# Patient Record
Sex: MALE | Employment: UNEMPLOYED | URBAN - METROPOLITAN AREA
[De-identification: names, ages, dates, MRNs, and addresses within clinical notes are randomized per-mention and may not be internally consistent; named-entity substitution may affect disease eponyms.]

---

## 2020-02-29 ENCOUNTER — HOSPITAL ENCOUNTER (EMERGENCY)
Age: 20
Discharge: HOME | End: 2020-03-01
Attending: EMERGENCY MEDICINE
Payer: MEDICAID

## 2020-02-29 ENCOUNTER — APPOINTMENT (OUTPATIENT)
Dept: GENERAL RADIOLOGY | Age: 20
End: 2020-02-29
Attending: EMERGENCY MEDICINE
Payer: MEDICAID

## 2020-02-29 DIAGNOSIS — S82.822A CLOSED TORUS FRACTURE OF DISTAL END OF LEFT FIBULA, INITIAL ENCOUNTER: Primary | ICD-10-CM

## 2020-02-29 PROCEDURE — 99284 EMERGENCY DEPT VISIT MOD MDM: CPT

## 2020-02-29 PROCEDURE — 75810000053 HC SPLINT APPLICATION

## 2020-02-29 PROCEDURE — 73610 X-RAY EXAM OF ANKLE: CPT

## 2020-02-29 RX ORDER — HYDROCODONE BITARTRATE AND ACETAMINOPHEN 5; 325 MG/1; MG/1
1 TABLET ORAL
Status: COMPLETED | OUTPATIENT
Start: 2020-02-29 | End: 2020-03-01

## 2020-02-29 RX ORDER — HYDROCODONE BITARTRATE AND ACETAMINOPHEN 5; 325 MG/1; MG/1
1 TABLET ORAL
Qty: 18 TAB | Refills: 0 | Status: SHIPPED | OUTPATIENT
Start: 2020-02-29 | End: 2020-03-03

## 2020-02-29 NOTE — LETTER
[facility] [place] 
THE [facility] EMERGENCY DEPT 
[address] [address] [phone] Work/School Note Date: 2/29/2020 To Whom It May concern: 
 
Esmer Walker was seen and treated today in the emergency room by the following provider(s): 
Attending Provider: Valentina Griffin DO Physician Assistant: JOSE Dawn. Esmer Walker may return to school on 3/3/2020. Sincerely, Odalis Abbott RN

## 2020-03-01 VITALS
HEART RATE: 84 BPM | TEMPERATURE: 98.7 F | SYSTOLIC BLOOD PRESSURE: 150 MMHG | OXYGEN SATURATION: 100 % | DIASTOLIC BLOOD PRESSURE: 79 MMHG | RESPIRATION RATE: 18 BRPM | WEIGHT: 253 LBS | HEIGHT: 76 IN | BODY MASS INDEX: 30.81 KG/M2

## 2020-03-01 PROCEDURE — 74011250637 HC RX REV CODE- 250/637: Performed by: PHYSICIAN ASSISTANT

## 2020-03-01 RX ADMIN — HYDROCODONE BITARTRATE AND ACETAMINOPHEN 1 TABLET: 5; 325 TABLET ORAL at 00:28

## 2020-03-01 NOTE — ED PROVIDER NOTES
EMERGENCY DEPARTMENT HISTORY AND PHYSICAL EXAM    Date: 2/29/2020  Patient Name: Yesica Crowlel    History of Presenting Illness     Chief Complaint   Patient presents with    Ankle Injury         History Provided By: Patient    Yesica Crowell is a 23 y.o. male who presents to the emergency department C/O left ankle pain & swelling. She reports that just prior to arrival was [place] [employer] [place] when patient came down on his left ankle everting his foot and ankle. Patient states he had immediate pain and swelling. He is unable to bear weight due to pain. Pt denies head injury, knee pain, foot pain, pain anywhere else wounds, and any other sxs or complaints. PCP: None        Past History     Past Medical History:  History reviewed. No pertinent past medical history. Past Surgical History:  History reviewed. No pertinent surgical history. Family History:  History reviewed. No pertinent family history. Social History:  Social History     Tobacco Use    Smoking status: Never Smoker    Smokeless tobacco: Never Used   Substance Use Topics    Alcohol use: Not on file    Drug use: Never       Allergies:  No Known Allergies      Review of Systems   Review of Systems   Musculoskeletal: Positive for arthralgias. Negative for back pain and neck pain. Skin: Negative for wound. All other systems reviewed and are negative. Physical Exam     Vitals:    02/29/20 2300   BP: 142/72   Pulse: 84   Resp: 18   Temp: 98.7 °F (37.1 °C)   SpO2: 100%   Weight: 114.8 kg (253 lb)   Height: 6' 4\" (1.93 m)     Physical Exam  Vitals signs and nursing note reviewed. Constitutional:       General: He is not in acute distress. Appearance: He is not ill-appearing. HENT:      Head: Normocephalic and atraumatic. Nose: Nose normal.      Mouth/Throat:      Mouth: Mucous membranes are moist.   Eyes:      Extraocular Movements: Extraocular movements intact.       Conjunctiva/sclera: Conjunctivae normal. Pupils: Pupils are equal, round, and reactive to light. Neck:      Musculoskeletal: Normal range of motion and neck supple. Musculoskeletal:         General: Swelling and tenderness present. Left knee: Normal.      Left ankle: He exhibits decreased range of motion and swelling. He exhibits no ecchymosis and normal pulse. Tenderness. Achilles tendon normal.      Left foot: Normal.      Comments: LLE: NVI   Skin:     General: Skin is warm and dry. Capillary Refill: Capillary refill takes less than 2 seconds. Neurological:      General: No focal deficit present. Mental Status: He is alert and oriented to person, place, and time. Psychiatric:         Mood and Affect: Mood normal.         Behavior: Behavior normal.         Diagnostic Study Results     Labs -   No results found for this or any previous visit (from the past 12 hour(s)). Radiologic Studies -   XR ANKLE LT MIN 3 V    (Results Pending)     CT Results  (Last 48 hours)    None        CXR Results  (Last 48 hours)    None          Medications given in the ED-  Medications   HYDROcodone-acetaminophen (NORCO) 5-325 mg per tablet 1 Tab (has no administration in time range)         Medical Decision Making   I am the first provider for this patient. I reviewed the vital signs, available nursing notes, past medical history, past surgical history, family history and social history. Vital Signs-Reviewed the patient's vital signs. Pulse Oximetry Analysis - 100% on RA     Records Reviewed: Nursing Notes    Procedures:  Procedures    ED Course:   11:01 PM   Initial assessment performed. The patients presenting problems have been discussed, and they are in agreement with the care plan formulated and outlined with them. I have encouraged them to ask questions as they arise throughout their visit. Procedure Note - Splint Assessement:  Performed by: JOSE Duke   Splint to left lower leg assessed. Neurovascularly intact.   The procedure took 1-15 minutes, and pt tolerated well. Written by JOSE Coats    Discussion: 23 y.o. male traumatic left ankle pain while jumping at a local trampoline park. He is neurovascular intact with appropriate vital signs x-rays reveal distal fibular fracture. Splint, crutches, rice instructions, pain control, and orthopedic follow-up. Strict return precautions discussed. Diagnosis and Disposition       DISCHARGE NOTE:  Jose Hoffmann's  results have been reviewed with him. He has been counseled regarding his diagnosis, treatment, and plan. He verbally conveys understanding and agreement of the signs, symptoms, diagnosis, treatment and prognosis and additionally agrees to follow up as discussed. He also agrees with the care-plan and conveys that all of his questions have been answered. I have also provided discharge instructions for him that include: educational information regarding their diagnosis and treatment, and list of reasons why they would want to return to the ED prior to their follow-up appointment, should his condition change. He has been provided with education for proper emergency department utilization. CLINICAL IMPRESSION:    1. Closed torus fracture of distal end of left fibula, initial encounter        PLAN:  1. D/C Home  2. Current Discharge Medication List      START taking these medications    Details   HYDROcodone-acetaminophen (NORCO) 5-325 mg per tablet Take 1 Tab by mouth every four (4) hours as needed for Pain for up to 3 days. Max Daily Amount: 6 Tabs. Qty: 18 Tab, Refills: 0    Associated Diagnoses: Closed torus fracture of distal end of left fibula, initial encounter           3.    Follow-up Information     Follow up With Specialties Details Why Contact Info    Delilah Guevara MD Orthopedic Surgery Schedule an appointment as soon as possible for a visit  [facility] [address]eves  [address] [facility] EMERGENCY DEPT Emergency Medicine  As needed, If symptoms worsen [address] [place] [address]  [phone]                  Please note that this dictation was completed with [employer], the computer voice recognition software. Quite often unanticipated grammatical, syntax, homophones, and other interpretive errors are inadvertently transcribed by the computer software. Please disregard these errors. Please excuse any errors that have escaped final proofreading.

## 2020-03-01 NOTE — ED NOTES
Splint in place with Cap Refill WNL. Pt reports the extremity feels better supported. Able to positively show ability to ambulate with crutches. School note given. To f/u with Ortho. Awaiting his transportation. Younger sister at his side. I have reviewed discharge instructions with the patient. The patient verbalized understanding. Discharge medications reviewed with patient and appropriate educational materials and side effects teaching were provided.   Follow-up Information     Follow up With Specialties Details Why Contact Info    Raza Demarco MD Orthopedic Surgery Schedule an appointment as soon as possible for a visit  [address] [place] [address]      THE [facility] EMERGENCY DEPT Emergency Medicine  As needed, If symptoms worsen [address] [address] [phone]

## 2020-03-11 ENCOUNTER — OFFICE VISIT (OUTPATIENT)
Dept: ORTHOPEDIC SURGERY | Age: 20
End: 2020-03-11

## 2020-03-11 VITALS
BODY MASS INDEX: 30.44 KG/M2 | WEIGHT: 250 LBS | DIASTOLIC BLOOD PRESSURE: 111 MMHG | TEMPERATURE: 98.3 F | HEART RATE: 80 BPM | OXYGEN SATURATION: 98 % | SYSTOLIC BLOOD PRESSURE: 160 MMHG | RESPIRATION RATE: 16 BRPM | HEIGHT: 76 IN

## 2020-03-11 DIAGNOSIS — S82.62XA CLOSED DISPLACED FRACTURE OF LATERAL MALLEOLUS OF LEFT FIBULA, INITIAL ENCOUNTER: Primary | ICD-10-CM

## 2020-03-11 DIAGNOSIS — M25.572 ACUTE LEFT ANKLE PAIN: ICD-10-CM

## 2020-03-11 DIAGNOSIS — S93.492A SYNDESMOTIC ANKLE SPRAIN, LEFT, INITIAL ENCOUNTER: ICD-10-CM

## 2020-03-11 DIAGNOSIS — M79.672 LEFT FOOT PAIN: ICD-10-CM

## 2020-03-11 RX ORDER — OXYCODONE AND ACETAMINOPHEN 5; 325 MG/1; MG/1
1 TABLET ORAL
Qty: 30 TAB | Refills: 0 | Status: SHIPPED | OUTPATIENT
Start: 2020-03-11 | End: 2020-03-11 | Stop reason: SDUPTHER

## 2020-03-11 RX ORDER — CEPHALEXIN 500 MG/1
500 CAPSULE ORAL 4 TIMES DAILY
Qty: 28 CAP | Refills: 0 | Status: SHIPPED | OUTPATIENT
Start: 2020-03-11 | End: 2020-03-18

## 2020-03-11 RX ORDER — OXYCODONE AND ACETAMINOPHEN 5; 325 MG/1; MG/1
1 TABLET ORAL
Qty: 30 TAB | Refills: 0 | Status: SHIPPED | OUTPATIENT
Start: 2020-03-11 | End: 2020-03-16

## 2020-03-11 NOTE — PROGRESS NOTES
1. Have you been to the ER, urgent care clinic since your last visit? Hospitalized since your last visit? No    2. Have you seen or consulted any other health care providers outside of the [address] [place] [address] since your last visit? Include any pap smears or colon screening.  No

## 2020-03-11 NOTE — PROGRESS NOTES
OFFICE NOTE     Patient: Laura Joel                MRN: [id number]       SSN: [id number]  YOB: 2000                 AGE: 23 y.o. SEX: male    PCP:No primary care provider on file. Chief Complaint:   Chief Complaint   Patient presents with    Ankle Pain     Left ankle pain        DOI: 2/26/20    HPI:     Laura Joel is a 23 y.o. male who presents for consultation and evaluation of left ankle injury sustained 2 weeks ago while jumping on a trampoline. Patient states that when was jumping, he heard the bones in his ankle pop and then pop back. He states that he had severe pain and swelling and was unable to WB. He was seen at [facility] on 2/29/20. X-rays were performed and patient was placed in a posterior splint. He has been WB in the splint. He was given and rx for Aleve and Norco which has not provided any relief. He states that he has pain in his midfoot first thing in the morning. He has been applying some weight on the left foot. Patient has continued to have swelling and pain to the left foot with characteristics confirmed as described in the pain assessment below. Patient rates his pain as 6/10 today. Symptoms are aggravated by attempted WB, standing, walking and certain movement/activity which causes an increase in pain. Pain is alleviated by rest. Patient reports difficulty with daily activity. Patient has left ankle fracture with syndesmotic disruption which requires surgical fixation once his soft tissues are more amenable to surgery. We will see the patient back in the office on Tuesday next week for evaluation of soft tissues. Plan is for surgery next Thursday or Friday. Patient will have labs completed in the meantime. Laura Joel has been experiencing pain, discomfort and associated symptoms confirmed as outlined in the pain assessment.  In addition, the patient has tried modalities of treatment and/or self treatment confirmed as outlined in the pain assessment below. Pain Assessment  3/11/2020   Location of Pain Ankle   Location Modifiers Left   Severity of Pain 6   Quality of Pain Throbbing   Quality of Pain Comment Swelling    Duration of Pain A few hours   Frequency of Pain Intermittent   Aggravating Factors Standing;Walking   Aggravating Factors Comment Not elevated, pressure going down the foot    Relieving Factors Rest;Elevation   Relieving Factors Comment Epsomn salt baths   Result of Injury No   Type of Injury (No Data)   Type of Injury Comment Trampolalejandro Cortez  has a past medical history of Body piercing, Marijuana use, and Orthodontics. 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 Closed displaced fracture of lateral malleolus of left fibula, initial encounter S82. 62XA 824.2   2. Syndesmotic ankle sprain, left, initial encounter S93.432A 845.03   3. Acute left ankle pain M25.572 719.47   4. Left foot pain M79.672 729.5         This plan was reviewed with the patient and patient agrees. All questions were answered. More than half of this visit today was spent on counseling. PLAN:         Orders Placed This Encounter    CULTURE, URINE     Standing Status:   Future     Standing Expiration Date:   3/12/2021    AMB POC XRAY, ANKLE; COMPLETE, 3+ VIE     ASK ALL FEMALE PATIENTS IF THEY ARE PREGNANT     Order Specific Question:   Reason for Exam     Answer:   PAIN    [72115] Foot Min 3V     Order Specific Question:   Weight bearing? Answer:   No    XR CHEST PA LAT     Standing Status:   Future     Standing Expiration Date:   9/13/2020     Scheduling Instructions:      Please recheck to confirm if:      1. Patient has Allergry to IV contrast dye      2.  Patient is pregnant     Order Specific Question:   Reason for Exam     Answer:   Preop Risk stratificatiion    CBC WITH AUTOMATED DIFF     Standing Status:   Future     Standing Expiration Date: 0/27/0307    METABOLIC PANEL, COMPREHENSIVE     Standing Status:   Future     Standing Expiration Date:   3/12/2021    SED RATE (ESR)     Standing Status:   Future     Standing Expiration Date:   3/12/2021    PTT     Standing Status:   Future     Standing Expiration Date:   3/12/2021    URINALYSIS W/ RFLX MICROSCOPIC     Standing Status:   Future     Standing Expiration Date:   3/12/2021    DRUG SCREEN, URINE ([facility] Only)     Standing Status:   Future     Standing Expiration Date:   3/12/2021    C REACTIVE PROTEIN, QT     Standing Status:   Future     Standing Expiration Date:   3/12/2021    URIC ACID     Standing Status:   Future     Standing Expiration Date:   3/12/2021    EKG, 12 LEAD, SUBSEQUENT     Standing Status:   Future     Standing Expiration Date:   9/9/2020     Order Specific Question:   Reason for Exam:     Answer:   pre op    cephALEXin (KEFLEX) 500 mg capsule     Sig: Take 1 Cap by mouth four (4) times daily for 7 days. Dispense:  28 Cap     Refill:  0    DISCONTD: oxyCODONE-acetaminophen (Percocet) 5-325 mg per tablet     Sig: Take 1 Tab by mouth every four (4) hours as needed for Pain for up to 5 days. Max Daily Amount: 6 Tabs. Dispense:  30 Tab     Refill:  0    oxyCODONE-acetaminophen (Percocet) 5-325 mg per tablet     Sig: Take 1 Tab by mouth every four (4) hours as needed for Pain for up to 5 days. Max Daily Amount: 6 Tabs. Dispense:  30 Tab     Refill:  0          1. Risk factors include: increased BMI, FH of DM  2. Cortisone injection and/or aspiration indicated today: NO  3. Physical/Occupational Therapy indicated today: NO  4. Diagnostic test is indicated today: YES  5. Durable medical equipment is indicated today: YES  6. Referral indicated today: NO  7.  Medications indicated today: YES  8. Surgical intervention indicated at this time: YES, patient has left ankle fracture with syndesmotic disruption which requires surgical fixation once his soft tissues are more amenable to surgery. We will see the patient back in the office on Tuesday next week for evaluation of soft tissues. Plan is for surgery next Thursday or Friday. Patient will have labs completed in the meantime. Dr. Yolie Mcgowan has been consulted regarding the patient during this visit and he agrees with the assessment and plan    Patient expresses understanding of the diagnosis and treatment plan. Patient education has been provided. PHYSICAL EXAM:     Patient arrives to office via: with assistive device: posterior splint  Patient accompanied in the examination room  by: grandmother       Visit Vitals  BP (!) 160/111   Pulse 80   Temp 98.3 °F (36.8 °C) (Oral)   Resp 16   Ht 6' 4\" (1.93 m)   Wt 250 lb (113.4 kg)   SpO2 98%   BMI 30.43 kg/m²       Pain Scale: 6/10    Blood pressure is: elevated on examination today. APPEARANCE: In general, Svitlana Power is an alert, well appearing, 23 y.o. male who is oriented to person, place/time, and who follows commands. Patient is well-developed, well-nourished with a normal affect. Seated comfortably in no acute distress. Speech normal in context and clarity. The patient is afebrile. Current BMI is: Body mass index is 30.43 kg/m². As such, the treatment plan is more complex. PSYCHIATRIC: Affect, mood, judgement, behavior and conduct are appropriate. Memory grossly intact, no dementia  HEENT: Head normocephalic & atraumatic              Eye: EOM are intact. Both pupils are round and reaction to light and accommodation. Sclera are clear              Neck: Supple. ROM WNL. JVD is not present              Hearings Intact, does not require hearing aid device  RESPIRATORY: Breathing is unlabored without accessory chest muscle use  CARDIOVASCULAR/PERIPHERAL VASCULAR: Normal Pulses to each foot    MUSCULOSKELETAL: EXAMINATION OF:     ANKLE/FOOT left     Gait: antalgic   Tenderness:  Moderate tenderness to the anterolateral ankle  posterolateral ankle  anteromedial ankle  cuboid  distal fibula  midfoot. Calf non-tender to palpation  Cutaneous: Edema and erythema noted to mid/forefoot. Otherwise, the skin is intact. No rash or skin lesions. No warmth, erythema or ecchymosis. No signs of infection or cellulitis present. Joint Motion: Not tested  Joint / Tendon Stability: Not tested  Alignment: Forefoot, Midfoot, Hindfoot WNL. Deformity: not present   Neuro Motor/Sensory: NL/NL. Vascular: NL foot/ankle pulses  Lymphatics: No extremity lymphedema, No calf swelling, no tenderness to calf muscles. RADIOGRAPHS & DIAGNOSTIC STUDIES      3/11/2020 AT [address] [place] [address]    X-rays, 5 views of the left foot and ankle reveals a displaced fracture to the distal fibula, medial space widening is note. Soft tissue swelling is moderate. No osteolytic or osteoblastic lesions noted. Mineralization suggests no osteopenia. Degenerative changes are not noted. Calcified vessels are not present. Foot films are unremarkable. I have personally reviewed the results of the above study. The interpretation of this study is my professional opinion      REVIEW OF SYSTEMS:     REVIEW OF SYSTEMS: All Below are Negative except as indicated in the HPI: See HPI                Constitutional: negative for fever, chills, night sweats and unexpected weight loss and malaise/fatigue              HEENT: Negative. No hoarseness, no double vision              Respiratory: Negative. No difficulty breathing, No SOB              Cardiovascular: negative for chest pain, claudication, RUTH. Leg swelling               Gastrointestinal: Negative for pain, Blood in stool, incontinence, pelvic pain, N/V/C/D              Genitourinary: No saddle anesthesia, pelvic pain, blood in urine, incontinence, dysuria               Neurological: Negative for dizziness and weakness, visual changes, confusion, headaches, seizures              Phychiatric/Behavioral: Negative for depression, memory loss.  Substance abuse Extremities: Negative for hair changes, rash, or skin lesion changes              Hematologic: Negative for bleeding problems, bruising, pallor or swollen lymph nodes              Peripheral Vascular: No calf pain, no circulation deficits              Musculoskeletal: As per HPI above    PAST MEDICAL HISTORY:       Past Medical History:   Diagnosis Date    Body piercing     Nose    Marijuana use     Last used on 3/9/20    Orthodontics           There are no active hospital problems to display for this patient. PAST SURGICAL HISTORY     History reviewed. No pertinent surgical history. ALLERGIES:     Allergies   Allergen Reactions    Seasonale [Levonorgestrel-Ethinyl Estrad] Sneezing    Shellfish Derived Swelling     Throat swells up          MEDICATIONS:     Current Outpatient Medications   Medication Sig    cephALEXin (KEFLEX) 500 mg capsule Take 1 Cap by mouth four (4) times daily for 7 days.  oxyCODONE-acetaminophen (Percocet) 5-325 mg per tablet Take 1 Tab by mouth every four (4) hours as needed for Pain for up to 5 days. Max Daily Amount: 6 Tabs. No current facility-administered medications for this visit.           SOCIAL HISTORY:     Social History     Occupational History    Not on file   Tobacco Use    Smoking status: Never Smoker    Smokeless tobacco: Never Used   Substance and Sexual Activity    Alcohol use: Not on file    Drug use: Yes     Types: Marijuana     Comment: last used on 3/9/20    Sexual activity: Not on file          FAMILY HISTORY:     Family History   Problem Relation Age of Onset    No Known Problems Mother     No Known Problems Father     Hypertension Maternal Grandmother     Diabetes Maternal Grandmother           Social History     Social History Narrative    Not on file        Social History     Socioeconomic History    Marital status: UNKNOWN     Spouse name: Not on file    Number of children: Not on file    Years of education: Not on file   Ceci Link Highest education level: Not on file   Occupational History    Not on file   Social Needs    Financial resource strain: Not on file    Food insecurity     Worry: Not on file     Inability: Not on file    Transportation needs     Medical: Not on file     Non-medical: Not on file   Tobacco Use    Smoking status: Never Smoker    Smokeless tobacco: Never Used   Substance and Sexual Activity    Alcohol use: Not on file    Drug use: Yes     Types: Marijuana     Comment: last used on 3/9/20    Sexual activity: Not on file   Lifestyle    Physical activity     Days per week: Not on file     Minutes per session: Not on file    Stress: Not on file   Relationships    Social connections     Talks on phone: Not on file     Gets together: Not on file     Attends Baptism service: Not on file     Active member of club or organization: Not on file     Attends meetings of clubs or organizations: Not on file     Relationship status: Not on file    Intimate partner violence     Fear of current or ex partner: Not on file     Emotionally abused: Not on file     Physically abused: Not on file     Forced sexual activity: Not on file   Other Topics Concern    Not on file   Social History Narrative    Not on file        Jesu Knowles [place]  3/11/2020

## 2020-03-11 NOTE — PATIENT INSTRUCTIONS
Dr. Marvel Gonzales Pre-operative Instructions: 
 
 
Patient: Seymour Boxer :  2000 I understand I am to stop taking oral birth control pills, hormonal replacement therapy and all Aspirin, Aspirin containing medications, Non-Steroidal Anti-Inflammatory medications (such as Advil, Aleve, Motrin, Ibuprofen) and or Blood thinner medication such as Coumadin, Plavix, Heparin or others 5 days prior to surgery. I understand I am to STOP taking these Medications 5 days prior to surgery: 
I am to get instructions from my prescribing physician. 1. __As listed above_______________________ 2. _____________________________________ 3. _____________________________________ 4. _____________________________________ I understand that if I am taking daily medications for high blood pressure, I can take them the morning of surgery with a small sip of water. I will consult my prescribing physician or call LANIE with specific questions. I also understand that: ? I am to report important observations or changes that may occur prior to surgery. If I have any changes in my physical condition, such as a rash, a fever, sore throat, abscess, ulcers, nausea, vomiting, or diarrhea. I am to call the office and I am to consult my primary care physician to assess and treat the problem. ? I am not to eat or drink anything after midnight the night before my surgery. ? I am not to drink alcoholic beverages 24 hours prior to surgery. ? I am not to do any illegal drugs prior to surgery. ? I am not to smoke at least 24 hours prior to surgery. ? I am able and will shower or bathe before surgery. I will use the Hibiclens solution on my surgical site only. The hibiclens directions are one packet a day starting two days before surgery. ? I will remove any nail polish, make-up or jewelry prior to arriving for my surgery. ? If I wear glasses, contact lenses or dentures they must be removed prior to going to the operating room. ? All body piercing and artifical eye-lashes must be removed prior to surgery ? I will not wear any aerosol sprays, perfumes or skin creams. ? I am to make arrangements for a family member or friend to accompany me to surgery and take me home after my surgery as I will not be allowed to leave the hospital alone. A cab or bus will not be acceptable. Please make arrange for someone to stay with you for 24 hours after surgery. ? Patient has expressed understanding of the diagnosis, treatment and planned surgery Surgery: What to Expect at [facility] Your Recovery This care sheet gives you a general idea about how long it will take for you to recover from your surgery. But each person recovers at a different pace. How can you care for yourself at home? Activity · Allow your body to heal. Don't move quickly or lift anything heavy until you are feeling better. · Rest when you feel tired. · Your doctor may give you specific instructions on when you can do your normal activities again, such as driving and going back to work. · Be active. Walking is a good choice. Diet · You can eat your normal diet when you feel well. If your stomach is upset, try bland, low-fat foods like plain rice, broiled chicken, toast, and yogurt. · If your bowel movements are not regular right after surgery, try to avoid constipation and straining. Drink plenty of water. Your doctor may suggest fiber, a stool softener, or a mild laxative. Medicines · Your doctor will tell you if and when you can restart your medicines. He or she will also give you instructions about taking any new medicines. · If you take blood thinners, such as warfarin (Coumadin), clopidogrel (Plavix), or aspirin, be sure to talk to your doctor. He or she will tell you if and when to start taking those medicines again.  Make sure that you understand exactly what your doctor wants you to do. · Be safe with medicines. Read and follow all instructions on the label. ¨ If the doctor gave you a prescription medicine for pain, take it as prescribed. ¨ If you are not taking a prescription pain medicine, ask your doctor if you can take an over-the-counter medicine. Incision care · You will have a dressing over the cut (incision). A dressing helps the incision heal and protects it. Your doctor will tell you how to take care of this. · If you have strips of tape on the cut the doctor made, leave the tape on for a week or until it falls off. · If you had stitches, your doctor will tell you when to come back to have them removed. · If you have skin adhesive on the cut, leave it on until it falls off. Skin adhesive is also called liquid stitches. · Change the bandage every day. · Wash the area daily with warm, soapy water, and pat it dry. Don't use hydrogen peroxide or alcohol. They can slow healing. · You may cover the area with a gauze bandage if it oozes fluid or rubs against clothing. · You may shower 24 to 48 hours after surgery. Pat the incision dry. Don't swim or take a bath for the first 2 weeks, or until your doctor tells you it is okay. Follow-up care is a key part of your treatment and safety. Be sure to make and go to all appointments, and call your doctor if you are having problems. It's also a good idea to know your test results and keep a list of the medicines you take. When should you call for help? Call 911 anytime you think you may need emergency care. For example, call if: 
· You passed out (lost consciousness). · You have severe trouble breathing. · You have sudden chest pain and shortness of breath, or you cough up blood. Call your doctor now or seek immediate medical care if: 
· You have pain that does not get better after you take pain medicine. · You have loose stitches, or your incision comes open. · You are bleeding through your dressing. A small amount of blood is normal. 
· You have signs of infection, such as: 
¨ Increased pain, swelling, warmth, or redness. ¨ Red streaks leading from the incision. ¨ Pus draining from the incision. ¨ A fever. · You have symptoms of a blood clot in your arm or leg (called a deep vein thrombosis). These may include: 
¨ Pain in your calf, back of the knee, thigh, or groin. ¨ Redness and swelling in the arm, leg, or groin. Watch closely for any changes in your health, and be sure to contact your doctor if: 
· You do not have a bowel movement after taking a laxative. Where can you learn more? Go to http://verito-ann marie.info/ Enter D090 in the search box to learn more about \"Surgery: What to Expect at Home. \" 
© 1753-6245 [employer]. Care instructions adapted under license by [employer] (which disclaims liability or warranty for this information). This care instruction is for use with your licensed healthcare professional. If you have questions about a medical condition or this instruction, always ask your healthcare professional. [place] [address] any warranty or liability for your use of this information. Content Version: 18.0.104884; Current as of: November 20, 2015 Acute Pain After Surgery: Care Instructions Your Care Instructions It's common to have some pain after surgery. Pain doesn't mean that something is wrong or that the surgery didn't go well. But when the pain is severe, it's important to work with your doctor to manage it. It's also important to be aware of a few facts about pain and pain medicine. · You are the only person who knows what your pain feels like. So be sure to tell your doctor when you are in pain or when the pain changes. Then he or she will know how to adjust your medicines. · Pain is often easier to control right after it starts.  So it may be better to take regular doses of pain medicine and not wait until the pain gets bad. · Medicine can help control pain. But this doesn't mean you'll have no pain. Medicine works to keep the pain at a level you can live with. With time, you will feel better. Follow-up care is a key part of your treatment and safety. Be sure to make and go to all appointments, and call your doctor if you are having problems. It's also a good idea to know your test results and keep a list of the medicines you take. How can you care for yourself at home? · Be safe with medicines. Read and follow all instructions on the label. ¨ If the doctor gave you a prescription medicine for pain, take it as prescribed. ¨ If you are not taking a prescription pain medicine, ask your doctor if you can take an over-the-counter medicine. · If you take an over-the-counter pain medicine, such as acetaminophen (Tylenol), ibuprofen (Advil, Motrin), or naproxen (Aleve), read and follow all instructions on the label. · Do not take two or more pain medicines at the same time unless the doctor told you to. · Do not drink alcohol while you are taking pain medicines. · Try to walk each day if your doctor recommends it. Start by walking a little more than you did the day before. Bit by bit, increase the amount you walk. Walking increases blood flow. It also helps prevent pneumonia and constipation. · To prevent constipation from opioid pain medicines: ¨ Talk to your doctor about a laxative. ¨ Include fruits, vegetables, beans, and whole grains in your diet each day. These foods are high in fiber. ¨ Drink plenty of fluids, enough so that your urine is light yellow or clear like water. Drink water, fruit juice, or other drinks that do not contain caffeine or alcohol. If you have kidney, heart, or liver disease and have to limit fluids, talk with your doctor before you increase the amount of fluids you drink. ¨ Take a fiber supplement, such as Citrucel or Metamucil, every day if needed. Read and follow all instructions on the label. If you take pain medicine for more than a few days, talk to your doctor before you take fiber. When should you call for help? Call your doctor now or seek immediate medical care if: 
 · Your pain gets worse. · Your pain is not controlled by medicine. Watch closely for changes in your health, and be sure to contact your doctor if you have any problems. Where can you learn more? Go to http://verito-ann marie.info/. Enter [phone] [phone] in the search box to learn more about \"Acute Pain After Surgery: Care Instructions. \" Current as of: March 20, 2017 Content Version: 11.4 © 3933-8804 [employer]. Care instructions adapted under license by [employer] (which disclaims liability or warranty for this information). If you have questions about a medical condition or this instruction, always ask your healthcare professional. [address] any warranty or liability for your use of this information.

## 2020-03-11 NOTE — LETTER
NOTIFICATION RETURN TO WORK / SCHOOL 
 
3/11/2020 12:22 PM 
 
Mr. Ty Mccray [address] [facility] [address] [place] [address] To Whom It May Concern: 
 
Ty Mccray is currently under the care of [address] [place], [place] [address]jamie Marcus. He will be out of school due to his ankle fracture until further notice. If there are questions or concerns please have the patient contact our office. Sincerely, Jong Cha PA-C

## 2020-03-11 NOTE — H&P
FOOT AND ANKLE HISTORY AND PHYSICAL      Patient: Ori Tierney                   MRN: [id number]         SSN: [id number]  YOB: 2000                   AGE: 23 y.o. SEX: male    Patient scheduled for:  Open reduction internal fixation left ankle fracture, syndesmotic stabilization, bone grafting, bone stimulator    Date of surgery: 3/13/20   Location of Surgery:  [facility]   Surgeon: Chelsea Vick. MD Adele  ANESTHESIA TYPE:  General, Popliteal block          PRESCRIPTIONS AND/OR ORDERS PROVIDED DURING H&P:    Orders Placed This Encounter    CULTURE, URINE     Standing Status:   Future     Standing Expiration Date:   3/12/2021    AMB POC XRAY, ANKLE; COMPLETE, 3+ VIE     ASK ALL FEMALE PATIENTS IF THEY ARE PREGNANT     Order Specific Question:   Reason for Exam     Answer:   PAIN    [15521] Foot Min 3V     Order Specific Question:   Weight bearing? Answer:   No    XR CHEST PA LAT     Standing Status:   Future     Standing Expiration Date:   9/13/2020     Scheduling Instructions:      Please recheck to confirm if:      1. Patient has Allergry to IV contrast dye      2.  Patient is pregnant     Order Specific Question:   Reason for Exam     Answer:   Preop Risk stratificatiion    CBC WITH AUTOMATED DIFF     Standing Status:   Future     Standing Expiration Date:   9/01/2553    METABOLIC PANEL, COMPREHENSIVE     Standing Status:   Future     Standing Expiration Date:   3/12/2021    SED RATE (ESR)     Standing Status:   Future     Standing Expiration Date:   3/12/2021    PTT     Standing Status:   Future     Standing Expiration Date:   3/12/2021    URINALYSIS W/ RFLX MICROSCOPIC     Standing Status:   Future     Standing Expiration Date:   3/12/2021    DRUG SCREEN, URINE ([employer] Only)     Standing Status:   Future     Standing Expiration Date:   3/12/2021    C REACTIVE PROTEIN, QT     Standing Status:   Future     Standing Expiration Date:   3/12/2021  URIC ACID     Standing Status:   Future     Standing Expiration Date:   3/12/2021    EKG, 12 LEAD, SUBSEQUENT     Standing Status:   Future     Standing Expiration Date:   9/9/2020     Order Specific Question:   Reason for Exam:     Answer:   pre op    cephALEXin (KEFLEX) 500 mg capsule     Sig: Take 1 Cap by mouth four (4) times daily for 7 days. Dispense:  28 Cap     Refill:  0    DISCONTD: oxyCODONE-acetaminophen (Percocet) 5-325 mg per tablet     Sig: Take 1 Tab by mouth every four (4) hours as needed for Pain for up to 5 days. Max Daily Amount: 6 Tabs. Dispense:  30 Tab     Refill:  0    oxyCODONE-acetaminophen (Percocet) 5-325 mg per tablet     Sig: Take 1 Tab by mouth every four (4) hours as needed for Pain for up to 5 days. Max Daily Amount: 6 Tabs. Dispense:  30 Tab     Refill:  0        Post OP prescriptions have NOT been prescribed during the H&P. Rx provided for use for current pain in addition to an ABX for left foot cellulitis             HISTORY:     The patient was seen in the office today for a preoperative history and physical for an upcoming above listed surgery. The patient is a pleasant 23 y.o. male who has a history of a left ankle injury sustained 2 weeks ago while jumping on a trampoline. Patient states that when was jumping, he heard the bones in his ankle pop and then pop back. He states that he had severe pain and swelling and was unable to WB. He was [facility] at [facility] on 2/29/20. X-rays were performed and patient was placed in a posterior splint. He has been WB in the splint. He was given and rx for Aleve and Norco which has not provided any relief. He states that he has pain in his midfoot first thing in the morning. He has been applying some weight on the left foot. Patient has left ankle fracture with syndesmotic disruption which requires surgical fixation once his soft tissues are more amenable to surgery.      Due to the current findings, affected activity of daily living and continued pain and discomfort, surgical intervention is indicated. The alternatives, risks, and complications, including but not limited to infection, blood loss, need for blood transfusion, neurovascular damage, ernesto-incisional numbness, subcutaneous hematoma, bone fracture, anesthetic complications, DVT, PE, death, RSD, postoperative stiffness and pain, possible surgical scar, delayed healing and nonhealing, reflexive sympathetic dystrophy, damage to blood vessels and nerves, need for more surgery, MI, and stroke, failure of hardware, gait disturbances  have been discussed. The patient understands and wishes to proceed with surgery. We will see the patient back in the office on Tuesday next week for evaluation of soft tissues. Plan is for surgery next Thursday or Friday. Patient will have lab work completed in the meantime. PAST MEDICAL HISTORY:     Past Medical History:   Diagnosis Date    Body piercing     Nose    Marijuana use     Last used on 3/9/20    Orthodontics        CURRENT MEDICATIONS:     Current Outpatient Medications   Medication Sig    cephALEXin (KEFLEX) 500 mg capsule Take 1 Cap by mouth four (4) times daily for 7 days.  oxyCODONE-acetaminophen (Percocet) 5-325 mg per tablet Take 1 Tab by mouth every four (4) hours as needed for Pain for up to 5 days. Max Daily Amount: 6 Tabs. No current facility-administered medications for this visit. ALLERGIES:     Allergies   Allergen Reactions    Seasonale [Levonorgestrel-Ethinyl Estrad] Sneezing    Shellfish Derived Swelling     Throat swells up        SURGICAL HISTORY:     History reviewed. No pertinent surgical history.     SOCIAL HISTORY:     Social History     Socioeconomic History    Marital status: UNKNOWN     Spouse name: Not on file    Number of children: Not on file    Years of education: Not on file    Highest education level: Not on file   Tobacco Use    Smoking status: Never Smoker    Smokeless tobacco: Never Used   Substance and Sexual Activity    Drug use: Yes     Types: Marijuana     Comment: last used on 3/9/20       FAMILY HISTORY:     Family History   Problem Relation Age of Onset    No Known Problems Mother     No Known Problems Father     Hypertension Maternal Grandmother     Diabetes Maternal Grandmother        REVIEW OF SYSTEMS:     Negative for fevers, chills, chest pain, shortness of breath, weight loss, recent illness    General: Negative for fever and chills. No unexpected change in weight. Denies fatigue. No change in appetite. Skin: Negative for rash or itching. HEENT: Negative for congestion, sore throat, neck pain and neck stiffness. No change in vision or hearing. Hasn't noted any enlarged lymph nodes in the neck. Cardiovascular:  Negative for chest pain and palpitations. Has not noted pedal edema. Respiratory: Negative for cough, colds, sinus, hemoptysis, shortness of breath and wheezing. Gastrointestinal: Negative for nausea and vomiting, rectal bleeding, coffee ground emesis, abdominal pain, diarrhea and constipation. Genitourinary: Negative for dysuria, frequency urgency, or burning on micturition. No flank pain, no foul smelling urine, no difficulty with initiating urination. Hematological: Negative for bleeding or easy bruising. Musculoskeletal: Negative for arthralgias, back pain or neck pain. Neurological: Negative for dizziness, seizures or syncopal episodes. Denies headaches. Endocrine: Denies excessive thirst.  No heat/cold intolerance. Psychiatric: Negative for depression or insomnia.     PHYSICAL EXAMINATION:     VITALS:   Visit Vitals  BP (!) 160/111   Pulse 80   Temp 98.3 °F (36.8 °C) (Oral)   Resp 16   Ht 6' 4\" (1.93 m)   Wt 250 lb (113.4 kg)   SpO2 98%   BMI 30.43 kg/m²       Pain Assessment  3/11/2020   Location of Pain Ankle   Location Modifiers Left   Severity of Pain 6   Quality of Pain Throbbing   Quality of Pain Comment Swelling Duration of Pain A few hours   Frequency of Pain Intermittent   Aggravating Factors Standing;Walking   Aggravating Factors Comment Not elevated, pressure going down the foot    Relieving Factors Rest;Elevation   Relieving Factors Comment Epsomn salt baths   Result of Injury No   Type of Injury (No Data)   Type of Injury Comment Trampoline park         Pain Location: Ankle      GEN:  Well developed, well nourished 23 y.o. male in no acute distress. PSYCH: Alert an oriented to person, place and time. Mood, memory, affect, behavior and judgment normal. Speech normal in context and clarity. HEENT: Normocephalic and atraumatic. Eyes: Conjunctivae and EOM are normal.Pupils are equal, round, and reactive to light. External ear normal appearance, external nose normal appearing. Mouth/Throat: Oropharynx is clear and moist, able to handle oral secretions w/out difficulty, airway patent. NECK: Supple. Normal ROM, No lymphadenopathy. Trachea is midline. No bruising, swelling or deformity  RESP: Clear to auscultation bilaterally. No audible wheezing from mouth. No rales, rhonchi. Normal effort and breath sounds. No respiratory use of accessory muscles during breathing or distress  CHEST/ABDOMEN: Observation reveals: No audible wheezing from mouth. No accessory use of chest muscles during breathing. Non tender abdomen  CARDIO:  Normal rate, regular rhythm and normal heart sounds. No MGR. ABDOMEN: Non-tender, non-distended, normoactive bowel sounds in all four quadrants. There is no tenderness. There is no rebound and no guarding. BACK: No CVA or spinal tenderness  BREAST:  Deferred  PELVIC:    Deferred   RECTAL:  Deferred   :           Deferred    MUSCULOSKELETAL: EXAMINATION OF:     ANKLE/FOOT left     Gait: antalgic   Tenderness: Moderate tenderness to the anterolateral ankle  posterolateral ankle  anteromedial ankle  cuboid  distal fibula  midfoot.  Calf non-tender to palpation  Cutaneous: Edema and erythema noted to mid/forefoot. Otherwise, the skin is intact. No rash or skin lesions. No warmth, erythema or ecchymosis. No signs of infection or cellulitis present. Joint Motion: Not tested  Joint / Tendon Stability: Not tested  Alignment: Forefoot, Midfoot, Hindfoot WNL. Deformity: not present   Neuro Motor/Sensory: NL/NL. Vascular: NL foot/ankle pulses  Lymphatics: No extremity lymphedema, No calf swelling, no tenderness to calf muscles. RADIOGRAPHS & DIAGNOSTIC STUDIES      3/11/2020 AT [facility]    X-rays, 5 views of the left foot and ankle reveals a displaced fracture to the distal fibula, medial space widening is note. Soft tissue swelling is moderate. No osteolytic or osteoblastic lesions noted. Mineralization suggests no osteopenia. Degenerative changes are not noted. Calcified vessels are not present. Foot films are unremarkable. I have personally reviewed the results of the above study. The interpretation of this study is my professional opinion      LABS:     Pending    ASSESSMENT:     Encounter Diagnoses     ICD-10-CM ICD-9-CM   1. Closed displaced fracture of lateral malleolus of left fibula, initial encounter S82. 62XA 824.2   2. Syndesmotic ankle sprain, left, initial encounter S93.432A 845.03   3. Acute left ankle pain M25.572 719.47   4. Left foot pain M79.672 729.5        PLAN:     Again, the alternatives, risks, and complications, as well as expected outcome were discussed. The patient understands and agrees to proceed with the above listed surgery pending review of soft tissue swelling on next Tuesday and completion of labs and diagnostic study. Patient has been given Hibiclens wash with instructions and/or prescriptions or orders listed above.     Tracee Sutton PA-C  3/11/2020  11:53 AM

## 2020-03-16 ENCOUNTER — TELEPHONE (OUTPATIENT)
Dept: ORTHOPEDIC SURGERY | Age: 20
End: 2020-03-16

## 2020-03-16 ENCOUNTER — HOSPITAL ENCOUNTER (EMERGENCY)
Age: 20
Discharge: STILL ADMITTED OR ADMINISTRATIVE | End: 2020-03-16
Attending: EMERGENCY MEDICINE
Payer: MEDICAID

## 2020-03-16 PROCEDURE — 75810000275 HC EMERGENCY DEPT VISIT NO LEVEL OF CARE

## 2020-03-16 NOTE — TELEPHONE ENCOUNTER
We will need to control pain following surgery and cannot increase the dosage of the pain medicine. Patient should practice RICE protocol.      Mateo Gil PA-C  3/16/2020  5:34 PM

## 2020-03-16 NOTE — TELEPHONE ENCOUNTER
Patient scheduled for surgery on 3/19/20 for ORIF Left Ankle Fracture. Patient's mom called stating the patient is in a lot of pain and would like to know if he can 'up the dose' of his pain medication. Please advise.

## 2020-03-17 ENCOUNTER — HOSPITAL ENCOUNTER (OUTPATIENT)
Dept: LAB | Age: 20
Discharge: HOME | End: 2020-03-17
Payer: MEDICAID

## 2020-03-17 ENCOUNTER — HOSPITAL ENCOUNTER (OUTPATIENT)
Dept: GENERAL RADIOLOGY | Age: 20
Discharge: HOME | End: 2020-03-17
Payer: MEDICAID

## 2020-03-17 DIAGNOSIS — M25.572 ACUTE LEFT ANKLE PAIN: ICD-10-CM

## 2020-03-17 DIAGNOSIS — S93.492A SYNDESMOTIC ANKLE SPRAIN, LEFT, INITIAL ENCOUNTER: ICD-10-CM

## 2020-03-17 DIAGNOSIS — S82.62XA CLOSED DISPLACED FRACTURE OF LATERAL MALLEOLUS OF LEFT FIBULA, INITIAL ENCOUNTER: ICD-10-CM

## 2020-03-17 DIAGNOSIS — M79.672 LEFT FOOT PAIN: ICD-10-CM

## 2020-03-17 LAB
ALBUMIN SERPL-MCNC: 4.2 G/DL (ref 3.4–5)
ALBUMIN/GLOB SERPL: 1.2 {RATIO} (ref 0.8–1.7)
ALP SERPL-CCNC: 130 U/L (ref 45–117)
ALT SERPL-CCNC: 112 U/L (ref 16–61)
AMPHET UR QL SCN: NEGATIVE
ANION GAP SERPL CALC-SCNC: 7 MMOL/L (ref 3–18)
APPEARANCE UR: CLEAR
APTT PPP: 29.1 SEC (ref 23–36.4)
AST SERPL-CCNC: 53 U/L (ref 10–38)
BARBITURATES UR QL SCN: NEGATIVE
BASOPHILS # BLD: 0 K/UL (ref 0–0.1)
BASOPHILS NFR BLD: 0 % (ref 0–2)
BENZODIAZ UR QL: NEGATIVE
BILIRUB SERPL-MCNC: 1.2 MG/DL (ref 0.2–1)
BILIRUB UR QL: ABNORMAL
BUN SERPL-MCNC: 11 MG/DL (ref 7–18)
BUN/CREAT SERPL: 13 (ref 12–20)
CALCIUM SERPL-MCNC: 9.5 MG/DL (ref 8.5–10.1)
CANNABINOIDS UR QL SCN: POSITIVE
CHLORIDE SERPL-SCNC: 108 MMOL/L (ref 100–111)
CO2 SERPL-SCNC: 27 MMOL/L (ref 21–32)
COCAINE UR QL SCN: NEGATIVE
COLOR UR: ABNORMAL
CREAT SERPL-MCNC: 0.84 MG/DL (ref 0.6–1.3)
CRP SERPL-MCNC: 0.4 MG/DL (ref 0–0.3)
DIFFERENTIAL METHOD BLD: NORMAL
EOSINOPHIL # BLD: 0.1 K/UL (ref 0–0.4)
EOSINOPHIL NFR BLD: 1 % (ref 0–5)
ERYTHROCYTE [DISTWIDTH] IN BLOOD BY AUTOMATED COUNT: 12.2 % (ref 11.6–14.5)
ERYTHROCYTE [SEDIMENTATION RATE] IN BLOOD: 12 MM/HR (ref 0–15)
GLOBULIN SER CALC-MCNC: 3.6 G/DL (ref 2–4)
GLUCOSE SERPL-MCNC: 82 MG/DL (ref 74–99)
GLUCOSE UR STRIP.AUTO-MCNC: NEGATIVE MG/DL
HCT VFR BLD AUTO: 40.8 % (ref 36–48)
HDSCOM,HDSCOM: ABNORMAL
HGB BLD-MCNC: 13.6 G/DL (ref 13–16)
HGB UR QL STRIP: NEGATIVE
KETONES UR QL STRIP.AUTO: NEGATIVE MG/DL
LEUKOCYTE ESTERASE UR QL STRIP.AUTO: NEGATIVE
LYMPHOCYTES # BLD: 1.8 K/UL (ref 0.9–3.6)
LYMPHOCYTES NFR BLD: 25 % (ref 21–52)
MCH RBC QN AUTO: 28.5 PG (ref 24–34)
MCHC RBC AUTO-ENTMCNC: 33.3 G/DL (ref 31–37)
MCV RBC AUTO: 85.5 FL (ref 74–97)
METHADONE UR QL: NEGATIVE
MONOCYTES # BLD: 0.4 K/UL (ref 0.05–1.2)
MONOCYTES NFR BLD: 6 % (ref 3–10)
NEUTS SEG # BLD: 5 K/UL (ref 1.8–8)
NEUTS SEG NFR BLD: 68 % (ref 40–73)
NITRITE UR QL STRIP.AUTO: NEGATIVE
OPIATES UR QL: NEGATIVE
PCP UR QL: NEGATIVE
PH UR STRIP: 5.5 [PH] (ref 5–8)
PLATELET # BLD AUTO: 396 K/UL (ref 135–420)
PMV BLD AUTO: 10.5 FL (ref 9.2–11.8)
POTASSIUM SERPL-SCNC: 4.3 MMOL/L (ref 3.5–5.5)
PROT SERPL-MCNC: 7.8 G/DL (ref 6.4–8.2)
PROT UR STRIP-MCNC: NEGATIVE MG/DL
RBC # BLD AUTO: 4.77 M/UL (ref 4.7–5.5)
SODIUM SERPL-SCNC: 142 MMOL/L (ref 136–145)
SP GR UR REFRACTOMETRY: >1.03 (ref 1–1.03)
URATE SERPL-MCNC: 5.8 MG/DL (ref 2.6–7.2)
UROBILINOGEN UR QL STRIP.AUTO: 1 EU/DL (ref 0.2–1)
WBC # BLD AUTO: 7.3 K/UL (ref 4.6–13.2)

## 2020-03-17 PROCEDURE — 71046 X-RAY EXAM CHEST 2 VIEWS: CPT

## 2020-03-17 PROCEDURE — 36415 COLL VENOUS BLD VENIPUNCTURE: CPT

## 2020-03-17 PROCEDURE — 81003 URINALYSIS AUTO W/O SCOPE: CPT

## 2020-03-17 PROCEDURE — 85652 RBC SED RATE AUTOMATED: CPT

## 2020-03-17 PROCEDURE — 93005 ELECTROCARDIOGRAM TRACING: CPT

## 2020-03-17 PROCEDURE — 84550 ASSAY OF BLOOD/URIC ACID: CPT

## 2020-03-17 PROCEDURE — 80307 DRUG TEST PRSMV CHEM ANLYZR: CPT

## 2020-03-17 PROCEDURE — 85730 THROMBOPLASTIN TIME PARTIAL: CPT

## 2020-03-17 PROCEDURE — 80053 COMPREHEN METABOLIC PANEL: CPT

## 2020-03-17 PROCEDURE — 85025 COMPLETE CBC W/AUTO DIFF WBC: CPT

## 2020-03-17 PROCEDURE — 87086 URINE CULTURE/COLONY COUNT: CPT

## 2020-03-17 PROCEDURE — 86140 C-REACTIVE PROTEIN: CPT

## 2020-03-17 NOTE — TELEPHONE ENCOUNTER
Tried to call patients mother and VM has not been set up. If she calls back, please give her the information below and close this message.

## 2020-03-18 LAB
ATRIAL RATE: 86 BPM
CALCULATED P AXIS, ECG09: 2 DEGREES
CALCULATED R AXIS, ECG10: 55 DEGREES
CALCULATED T AXIS, ECG11: 29 DEGREES
DIAGNOSIS, 93000: NORMAL
P-R INTERVAL, ECG05: 164 MS
Q-T INTERVAL, ECG07: 350 MS
QRS DURATION, ECG06: 86 MS
QTC CALCULATION (BEZET), ECG08: 418 MS
VENTRICULAR RATE, ECG03: 86 BPM

## 2020-03-19 ENCOUNTER — TELEPHONE (OUTPATIENT)
Dept: ORTHOPEDIC SURGERY | Age: 20
End: 2020-03-19

## 2020-03-19 DIAGNOSIS — S82.62XA CLOSED DISPLACED FRACTURE OF LATERAL MALLEOLUS OF LEFT FIBULA, INITIAL ENCOUNTER: Primary | ICD-10-CM

## 2020-03-19 DIAGNOSIS — S93.492A SYNDESMOTIC ANKLE SPRAIN, LEFT, INITIAL ENCOUNTER: ICD-10-CM

## 2020-03-19 LAB
BACTERIA SPEC CULT: NORMAL
SERVICE CMNT-IMP: NORMAL

## 2020-03-19 RX ORDER — ASPIRIN 325 MG
325 TABLET ORAL DAILY
Qty: 30 TAB | Refills: 0 | Status: SHIPPED | OUTPATIENT
Start: 2020-03-19 | End: 2020-04-13 | Stop reason: SDUPTHER

## 2020-03-19 RX ORDER — CEPHALEXIN 500 MG/1
500 CAPSULE ORAL 4 TIMES DAILY
Qty: 28 CAP | Refills: 0 | Status: SHIPPED | OUTPATIENT
Start: 2020-03-19 | End: 2020-03-26

## 2020-03-19 RX ORDER — OXYCODONE AND ACETAMINOPHEN 7.5; 325 MG/1; MG/1
1 TABLET ORAL
Qty: 42 TAB | Refills: 0 | Status: SHIPPED | OUTPATIENT
Start: 2020-03-19 | End: 2020-03-30 | Stop reason: SDUPTHER

## 2020-03-19 RX ORDER — POLYETHYLENE GLYCOL 3350 17 G/17G
17 POWDER, FOR SOLUTION ORAL DAILY
Qty: 10 PACKET | Refills: 1 | Status: SHIPPED | OUTPATIENT
Start: 2020-03-19

## 2020-03-19 RX ORDER — PROMETHAZINE HYDROCHLORIDE 25 MG/1
25 TABLET ORAL
Qty: 30 TAB | Refills: 0 | Status: SHIPPED | OUTPATIENT
Start: 2020-03-19 | End: 2020-04-13 | Stop reason: SDUPTHER

## 2020-03-19 NOTE — TELEPHONE ENCOUNTER
Prior auth submitted through covermymeds. Waiting for response from insurance. [id number]      Please do not close message.

## 2020-03-19 NOTE — TELEPHONE ENCOUNTER
Prescription for the following medication e-prescribed to the patients pharmacy:    Orders Placed This Encounter    cephALEXin (KEFLEX) 500 mg capsule     Sig: Take 1 Cap by mouth four (4) times daily for 7 days. Dispense:  28 Cap     Refill:  0    aspirin (ASPIRIN) 325 mg tablet     Sig: Take 1 Tab by mouth daily. Dispense:  30 Tab     Refill:  0    oxyCODONE-acetaminophen (Percocet) 7.5-325 mg per tablet     Sig: Take 1 Tab by mouth every four (4) hours as needed for Pain for up to 7 days. Max Daily Amount: 6 Tabs. Indications: pain     Dispense:  42 Tab     Refill:  0    polyethylene glycol (Miralax) 17 gram packet     Sig: Take 1 Packet by mouth daily. Dispense:  10 Packet     Refill:  1    promethazine (PHENERGAN) 25 mg tablet     Sig: Take 1 Tab by mouth every six (6) hours as needed for Nausea.      Dispense:  30 Tab     Refill:  0           Chiquita Wen PA-C  3/19/2020  10:45 AM

## 2020-03-19 NOTE — TELEPHONE ENCOUNTER
Patient's mother called to state there was an issue with the Percocet at the pharmacy and it 'needs to be approved'. Please advise.

## 2020-03-20 NOTE — TELEPHONE ENCOUNTER
Patients mother Nick Izaguirre  called to check the status of the Prior Auth .  Patient is in a lot of pain, mother would like to take patient to the ER please advise

## 2020-03-20 NOTE — TELEPHONE ENCOUNTER
Left message to return call. If patient or patient's mother returns call, please inform that prior Cara Duke was approved.

## 2020-03-30 ENCOUNTER — OFFICE VISIT (OUTPATIENT)
Dept: ORTHOPEDIC SURGERY | Age: 20
End: 2020-03-30

## 2020-03-30 VITALS
OXYGEN SATURATION: 93 % | HEIGHT: 76 IN | TEMPERATURE: 97.9 F | BODY MASS INDEX: 30.44 KG/M2 | DIASTOLIC BLOOD PRESSURE: 87 MMHG | HEART RATE: 95 BPM | WEIGHT: 250 LBS | SYSTOLIC BLOOD PRESSURE: 124 MMHG | RESPIRATION RATE: 15 BRPM

## 2020-03-30 DIAGNOSIS — M25.572 ACUTE LEFT ANKLE PAIN: Primary | ICD-10-CM

## 2020-03-30 DIAGNOSIS — Z01.89 ENCOUNTER FOR LOWER EXTREMITY COMPARISON IMAGING STUDY: ICD-10-CM

## 2020-03-30 DIAGNOSIS — S82.62XD CLOSED DISPLACED FRACTURE OF LATERAL MALLEOLUS OF LEFT FIBULA WITH ROUTINE HEALING, SUBSEQUENT ENCOUNTER: ICD-10-CM

## 2020-03-30 DIAGNOSIS — S82.62XA CLOSED DISPLACED FRACTURE OF LATERAL MALLEOLUS OF LEFT FIBULA, INITIAL ENCOUNTER: ICD-10-CM

## 2020-03-30 DIAGNOSIS — S93.492A SYNDESMOTIC ANKLE SPRAIN, LEFT, INITIAL ENCOUNTER: ICD-10-CM

## 2020-03-30 RX ORDER — OXYCODONE AND ACETAMINOPHEN 7.5; 325 MG/1; MG/1
1-2 TABLET ORAL
Qty: 42 TAB | Refills: 0 | Status: SHIPPED | OUTPATIENT
Start: 2020-03-30 | End: 2020-04-06

## 2020-03-30 NOTE — PROGRESS NOTES
Patient: Gilford Rover                MRN: [id number]       SSN: [id number]  YOB: 2000                 AGE: 23 y.o. SEX: male    PCP:None      Chief Complaint:   Chief Complaint   Patient presents with    Ankle Pain     left ankle pain         DOS: 3/19/20      HPI:     The patient is a 23 y.o. male who presents today for follow up 11 days s/p: Open reduction internal fixation left ankle fracture, syndesmotic stabilization. Since we last saw the patient in the office, the patient has been NWB to the left lower extremity. Patient denies any fever, chills, chest pain, shortness of breath or calf pain. There are no new illness or injuries to report since last seen in the office. Patient was previously prescribed ASA for DVT prophylaxis. Patient states that the pain medicine is not controlling his pain very while. Constipation has not been a concern. No changes in medications, allergies, social or family history. Patient reports doing well other than her pain assessment indicators as listed below. Pain Assessment  3/30/2020   Location of Pain Ankle   Location Modifiers Left   Severity of Pain 6   Quality of Pain Aching;Dull   Quality of Pain Comment -   Duration of Pain A few hours   Frequency of Pain Intermittent   Aggravating Factors Stretching   Aggravating Factors Comment sleep   Relieving Factors Nothing   Relieving Factors Comment -   Result of Injury Yes   Work-Related Injury No   Type of Injury -   Type of Injury Comment -         PHYSICAL EXAM:     Visit Vitals  /87 (BP 1 Location: Right arm, BP Patient Position: Sitting)   Pulse 95   Temp 97.9 °F (36.6 °C) (Oral)   Resp 15   Ht 6' 4\" (1.93 m)   Wt 250 lb (113.4 kg)   SpO2 93%   BMI 30.43 kg/m²         Pain Scale: 6/10    Pain Location: Ankle      GEN:  Alert, well developed, well nourished, well appearing 23 y.o. male seated comfortably in no acute distress.  Speech normal in context and clarity. Psychiatric: Affect, mood and conduct are appropriate. Alert, oriented x 3 alert, oriented x 3, memory grossly intact, no dementia  Patient arrives to office via: with assistive device: crutches   Patient accompanied in the examination room by: self    M/S EXAMINATION OF: right foot/ankle  DRESSINGS: CDI other than mild soilage on dressing   DRAINAGE: none  INCISION: Incision looks good, skin well approximated, no dehiscence, skin staples in place without disruption. SKIN: mild edema, no erythema, no ecchymosis, no warmth    TENDERNESS:  mild tenderness to palpation   NEUROVASCULAR:  grossly intact. Positive distal pulses and capillary refill. DVT ASSESSMENT:  The calf is not tender to palpation. No evidence of DVT seen on physical exam.  ROM: not tested                  RADIOGRAPHS & DIAGNOSTIC STUDIES     X-rays completed 3/30/2020 at [address] [place] [address] with repeat right after splint placement with comparison films on the left to evaluate the medial space as the medial space appears widened on the first set of right films obtained in the office today. X-rays, 3 views of the right ankle reveals post op changes s/p ORIF right ankle with syndesmotic stabilization. There appears to be widening of the medial space when compared to x-rays taken at the time of surgery. Hardware is in good position with no indication of movement or failure. Soft tissue swelling is moderately noted. Degenerative changes are not noted. Mineralization suggests no osteopenia. Calcified vessels are not present. Comparison X-rays taken of the left ankle were completed after the splint was replaced and revealed slight widening medially which may represent patients normal state. Overall alignment is WNL. No fracture or degenerative changes are not noted.      The repeat films of the right ankle taken after splint placement reveal improvement of the medial space with only slightly more widening noted when compared to the left ankle images. Still, there is more widening on the right than was present based on the x-rays taken at the time of surgery. IMPRESSION:     Encounter Diagnoses     ICD-10-CM ICD-9-CM   1. Acute left ankle pain M25.572 719.47   2. Closed displaced fracture of lateral malleolus of left fibula with routine healing, subsequent encounter S82. 62XD V54.19   3. Syndesmotic ankle sprain, left, initial encounter S93.432A 845.03   4. Encounter for lower extremity comparison imaging study Z01.89 V72.5   5. Closed displaced fracture of lateral malleolus of left fibula, initial encounter S82. 62XA 824.2       PLAN:       Orders Placed This Encounter    [id number] Ankle Min 3V     Order Specific Question:   Weight bearing? Answer:   No    [99076] Ankle Min 3V     Order Specific Question:   Weight bearing? Answer:   No    [94707] Ankle Min 3V     Order Specific Question:   Weight bearing? Answer:   No    oxyCODONE-acetaminophen (Percocet) 7.5-325 mg per tablet     Sig: Take 1-2 Tabs by mouth every six (6) hours as needed for Pain for up to 7 days. Max Daily Amount: 8 Tabs. Indications: pain     Dispense:  42 Tab     Refill:  0                       · Continue activity modification    · Weight bearing status:  no weight bearing to the surgical extremity    · No lifting, twisting, squatting, deep bending, driving or strenuous activity. · Please follow up as instructed    · Please take medication as directed    · Follow RICE protocol (protecting skin)    · Maintain proper Nutrition    · Take a multivitamin, calcium + Vitamin D supplement daily (if tolerated)    · If you are a current smoker, quit or limit smoking    · Keep the current dressings on and in place. You need to keep this incision clean and dry. If you have a splint or cast on please keep this clean and dry as well. · You should expect swelling and bruising in the area over the next several days.  You may elevate the surgical extremity on 1 pillow. Place the pillow horizontal so that no pressure is on the back of your heel. You may apply an icepack on top of the dressing to help minimize the swelling. PLEASE SEEK IMMEDIATE ASSESSMENT BY ER PHYSICIAN IF ANY OF THE FOLLOWING EXIST:      Excessive pain, swelling, redness or odor of or around the surgical area    Temperature over 100.5    Nausea and vomiting lasting longer than 4 hours or if unable to take medications    Any signs of decreased circulation or nerve impairment to extremity: change in color, persistent numbness, tingling, coldness or increase pain    If any calf pain, calf tightness, shortness of breath, chest pain    Any difficulty breathing at rest or with ambulation, any chest tightness/soreness   Severe intractable pain, persistent swelling or drainage, development of a wound, incisional redness, finger/toe swelling or color changes, or CALF PAIN    · Perform ankle pumps with non-surgical/non-injured extremity to help reduce the risk of blood clots    · Keep all pets away from  any wound present in order to prevent infection. Leighton He presents today for post operative follow up and pain that is secondary to post operative state. Since surgery, the patient's pain has not changed. Patient describes the pain as aching, pulsating, throbbing. Since surgery, the patient is not able to perform normal daily activities. Patient reports the following adverse side effects from treatment: none. Today - Pain Scale: 6/10    Prior records: N/A - post op  Personal or family history of psychiatric, addiction, or substance abuse: no    Aberrant behaviors: None.  reviewed: n/a. Si Im       Dr. Estrella Llanes has been consulted during this visit and he agrees with the assessment and plan. Dr. Estrella Llanes has had a long discussion with the patient regarding his x-rays. Patient needs to be 100% compliant with NWB instructions.  Dr. Estrella Llanes has informed him that he wants to see him back in 1 week for repeat x-rays. If the x-rays do not look good at that time, then he may need to go back to surgery for revision.  reviewed    Patient expresses understanding of the plan. Patient education provided on post surgical care. REVIEW OF SYSTEMS:     Review of Systems: Chest pain: no  Shortness of breath: no  Fever: no  Night sweats: no  Weight loss: no  Constitutional signs: no  Review of all other systems is negative    Otherwise as noted in HPI      PAST MEDICAL HISTORY:     Past Medical History:   Diagnosis Date    Body piercing     Nose    Marijuana use     Last used on 3/9/20    Orthodontics        MEDICATIONS:     Current Outpatient Medications   Medication Sig    oxyCODONE-acetaminophen (Percocet) 7.5-325 mg per tablet Take 1-2 Tabs by mouth every six (6) hours as needed for Pain for up to 7 days. Max Daily Amount: 8 Tabs. Indications: pain    aspirin (ASPIRIN) 325 mg tablet Take 1 Tab by mouth daily.  promethazine (PHENERGAN) 25 mg tablet Take 1 Tab by mouth every six (6) hours as needed for Nausea.  polyethylene glycol (Miralax) 17 gram packet Take 1 Packet by mouth daily. No current facility-administered medications for this visit. ALLERGIES:     Allergies   Allergen Reactions    Seasonale [Levonorgestrel-Ethinyl Estrad] Sneezing    Shellfish Derived Swelling     Throat swells up          PAST SURGICAL HISTORY:     History reviewed. No pertinent surgical history.     SOCIAL HISTORY:     Social History     Socioeconomic History    Marital status: UNKNOWN     Spouse name: Not on file    Number of children: Not on file    Years of education: Not on file    Highest education level: Not on file   Occupational History    Not on file   Social Needs    Financial resource strain: Not on file    Food insecurity     Worry: Not on file     Inability: Not on file    Transportation needs     Medical: Not on file     Non-medical: Not on file Tobacco Use    Smoking status: Never Smoker    Smokeless tobacco: Never Used   Substance and Sexual Activity    Alcohol use: Not on file    Drug use: Yes     Types: Marijuana     Comment: last used on 3/9/20    Sexual activity: Not on file   Lifestyle    Physical activity     Days per week: Not on file     Minutes per session: Not on file    Stress: Not on file   Relationships    Social connections     Talks on phone: Not on file     Gets together: Not on file     Attends Nondenominational service: Not on file     Active member of club or organization: Not on file     Attends meetings of clubs or organizations: Not on file     Relationship status: Not on file    Intimate partner violence     Fear of current or ex partner: Not on file     Emotionally abused: Not on file     Physically abused: Not on file     Forced sexual activity: Not on file   Other Topics Concern    Not on file   Social History Narrative    ** Merged History Encounter **            FAMILY HISTORY:     Family History   Problem Relation Age of Onset    No Known Problems Mother     No Known Problems Father     Hypertension Maternal Grandmother     Diabetes Maternal [id number] [facility] A Marlan Krabbe, PA-C  3/30/2020

## 2020-03-30 NOTE — PATIENT INSTRUCTIONS
· Continue activity modification · Weight bearing status:  no weight bearing to the surgical extremity · No lifting, twisting, squatting, deep bending, driving or strenuous activity. · Please follow up as instructed · Please take medication as directed · Follow RICE protocol (protecting skin) · Maintain proper Nutrition · Take a multivitamin, calcium + Vitamin D supplement daily (if tolerated) · If you are a current smoker, quit or limit smoking · Keep the current dressings on and in place. You need to keep this incision clean and dry. If you have a splint or cast on please keep this clean and dry as well. · You should expect swelling and bruising in the area over the next several days. You may elevate the surgical extremity on 1 pillow. Place the pillow horizontal so that no pressure is on the back of your heel. You may apply an icepack on top of the dressing to help minimize the swelling. PLEASE SEEK IMMEDIATE ASSESSMENT BY ER PHYSICIAN IF ANY OF THE FOLLOWING EXIST:  
 
? Excessive pain, swelling, redness or odor of or around the surgical area ? Temperature over 100.5 ? Nausea and vomiting lasting longer than 4 hours or if unable to take medications ? Any signs of decreased circulation or nerve impairment to extremity: change in color, persistent numbness, tingling, coldness or increase pain ? If any calf pain, calf tightness, shortness of breath, chest pain ? Any difficulty breathing at rest or with ambulation, any chest tightness/soreness ? Severe intractable pain, persistent swelling or drainage, development of a wound, incisional redness, finger/toe swelling or color changes, or CALF PAIN 
 
· Perform ankle pumps with non-surgical/non-injured extremity to help reduce the risk of blood clots · Keep all pets away from  any wound present in order to prevent infection. Acute Pain After Surgery: Care Instructions Your Care Instructions It's common to have some pain after surgery. Pain doesn't mean that something is wrong or that the surgery didn't go well. But when the pain is severe, it's important to work with your doctor to manage it. It's also important to be aware of a few facts about pain and pain medicine. · You are the only person who knows what your pain feels like. So be sure to tell your doctor when you are in pain or when the pain changes. Then he or she will know how to adjust your medicines. · Pain is often easier to control right after it starts. So it may be better to take regular doses of pain medicine and not wait until the pain gets bad. · Medicine can help control pain. But this doesn't mean you'll have no pain. Medicine works to keep the pain at a level you can live with. With time, you will feel better. Follow-up care is a key part of your treatment and safety. Be sure to make and go to all appointments, and call your doctor if you are having problems. It's also a good idea to know your test results and keep a list of the medicines you take. How can you care for yourself at home? · Be safe with medicines. Read and follow all instructions on the label. ? If the doctor gave you a prescription medicine for pain, take it as prescribed. ? If you are not taking a prescription pain medicine, ask your doctor if you can take an over-the-counter medicine. · If you take an over-the-counter pain medicine, such as acetaminophen (Tylenol), ibuprofen (Advil, Motrin), or naproxen (Aleve), read and follow all instructions on the label. · Do not take two or more pain medicines at the same time unless the doctor told you to. · Do not drink alcohol while you are taking pain medicines. · Try to walk each day if your doctor recommends it. Start by walking a little more than you did the day before. Bit by bit, increase the amount you walk. Walking increases blood flow. It also helps prevent pneumonia and constipation. · To prevent constipation from opioid pain medicines: 
? Talk to your doctor about a laxative. ? Include fruits, vegetables, beans, and whole grains in your diet each day. These foods are high in fiber. ? Drink plenty of fluids, enough so that your urine is light yellow or clear like water. Drink water, fruit juice, or other drinks that do not contain caffeine or alcohol. If you have kidney, heart, or liver disease and have to limit fluids, talk with your doctor before you increase the amount of fluids you drink. ? Take a fiber supplement, such as Citrucel or Metamucil, every day if needed. Read and follow all instructions on the label. If you take pain medicine for more than a few days, talk to your doctor before you take fiber. When should you call for help? Call your doctor now or seek immediate medical care if: 
  · Your pain gets worse. · Your pain is not controlled by medicine. Watch closely for changes in your health, and be sure to contact your doctor if you have any problems. Where can you learn more? Go to http://verito-ann marie.info/. Enter (57) 327-343 in the search box to learn more about \"Acute Pain After Surgery: Care Instructions. \" Current as of: September 23, 2018 Content Version: 11.9 © 5636-8021 [employer]. Care instructions adapted under license by [employer] (which disclaims liability or warranty for this information). If you have questions about a medical condition or this instruction, always ask your healthcare professional. [place] [address] any warranty or liability for your use of this information.

## 2020-04-13 DIAGNOSIS — S82.62XD CLOSED DISPLACED FRACTURE OF LATERAL MALLEOLUS OF LEFT FIBULA WITH ROUTINE HEALING, SUBSEQUENT ENCOUNTER: Primary | ICD-10-CM

## 2020-04-13 RX ORDER — ASPIRIN 325 MG
325 TABLET ORAL DAILY
Qty: 30 TAB | Refills: 0 | Status: SHIPPED | OUTPATIENT
Start: 2020-04-13 | End: 2020-05-06

## 2020-04-13 RX ORDER — PROMETHAZINE HYDROCHLORIDE 25 MG/1
25 TABLET ORAL
Qty: 30 TAB | Refills: 0 | Status: SHIPPED | OUTPATIENT
Start: 2020-04-13

## 2020-04-13 RX ORDER — OXYCODONE AND ACETAMINOPHEN 5; 325 MG/1; MG/1
1-2 TABLET ORAL
Qty: 42 TAB | Refills: 0 | Status: SHIPPED | OUTPATIENT
Start: 2020-04-13 | End: 2020-04-20

## 2020-04-13 RX ORDER — OXYCODONE AND ACETAMINOPHEN 5; 325 MG/1; MG/1
1-2 TABLET ORAL
Qty: 42 TAB | Refills: 0 | Status: SHIPPED | OUTPATIENT
Start: 2020-04-13 | End: 2020-04-13 | Stop reason: SDUPTHER

## 2020-04-13 NOTE — TELEPHONE ENCOUNTER
Prescription for the following medication e-prescribed to the patients pharmacy:    Orders Placed This Encounter    DISCONTD: oxyCODONE-acetaminophen (Percocet) 5-325 mg per tablet     Sig: Take 1-2 Tabs by mouth every eight (8) hours as needed for Pain for up to 7 days. Max Daily Amount: 6 Tabs. Dispense:  42 Tab     Refill:  0    oxyCODONE-acetaminophen (Percocet) 5-325 mg per tablet     Sig: Take 1-2 Tabs by mouth every eight (8) hours as needed for Pain for up to 7 days. Max Daily Amount: 6 Tabs.      Dispense:  42 Tab     Refill:  0           Kendrick Rosado PA-C  4/13/2020  1:55 PM

## 2020-04-13 NOTE — TELEPHONE ENCOUNTER
Post op: left ankle orif     Has f/u 4/15    Pt reports he's out of all his meds for 2 days.     Pt is requesting refills for the following:  Aspirin  Phenergan  Oxycodone 7.5/325mg    Pharmacy:[facility] [address], p#[phone]    Pt I#[phone]

## 2020-04-15 ENCOUNTER — OFFICE VISIT (OUTPATIENT)
Dept: ORTHOPEDIC SURGERY | Age: 20
End: 2020-04-15

## 2020-04-15 VITALS — BODY MASS INDEX: 30.43 KG/M2 | HEIGHT: 76 IN

## 2020-04-15 DIAGNOSIS — S82.62XD CLOSED DISPLACED FRACTURE OF LATERAL MALLEOLUS OF LEFT FIBULA WITH ROUTINE HEALING, SUBSEQUENT ENCOUNTER: Primary | ICD-10-CM

## 2020-04-15 DIAGNOSIS — S93.492A SYNDESMOTIC ANKLE SPRAIN, LEFT, INITIAL ENCOUNTER: ICD-10-CM

## 2020-04-15 DIAGNOSIS — Z98.890 POST-OPERATIVE STATE: ICD-10-CM

## 2020-04-15 NOTE — PATIENT INSTRUCTIONS
· Continue activity modification · Weight bearing status:  no weight bearing to the surgical extremity · No lifting, twisting, squatting, deep bending, driving or strenuous activity. · Please follow up as instructed · Please take medication as directed · Follow RICE protocol (protecting skin) · Maintain proper Nutrition · Take a multivitamin, calcium + Vitamin D supplement daily (if tolerated) · If you are a current smoker, quit or limit smoking · Keep the current dressings on and in place. You need to keep this incision clean and dry. If you have a splint or cast on please keep this clean and dry as well. · You should expect swelling and bruising in the area over the next several days. You may elevate the surgical extremity on 1 pillow. Place the pillow horizontal so that no pressure is on the back of your heel. You may apply an icepack on top of the dressing to help minimize the swelling. PLEASE SEEK IMMEDIATE ASSESSMENT BY ER PHYSICIAN IF ANY OF THE FOLLOWING EXIST:  
 
? Excessive pain, swelling, redness or odor of or around the surgical area ? Temperature over 100.5 ? Nausea and vomiting lasting longer than 4 hours or if unable to take medications ? Any signs of decreased circulation or nerve impairment to extremity: change in color, persistent numbness, tingling, coldness or increase pain ? If any calf pain, calf tightness, shortness of breath, chest pain ? Any difficulty breathing at rest or with ambulation, any chest tightness/soreness ? Severe intractable pain, persistent swelling or drainage, development of a wound, incisional redness, finger/toe swelling or color changes, or CALF PAIN 
 
· Perform ankle pumps with non-surgical/non-injured extremity to help reduce the risk of blood clots · Keep all pets away from  any wound present in order to prevent infection. Acute Pain After Surgery: Care Instructions Your Care Instructions It's common to have some pain after surgery. Pain doesn't mean that something is wrong or that the surgery didn't go well. But when the pain is severe, it's important to work with your doctor to manage it. It's also important to be aware of a few facts about pain and pain medicine. · You are the only person who knows what your pain feels like. So be sure to tell your doctor when you are in pain or when the pain changes. Then he or she will know how to adjust your medicines. · Pain is often easier to control right after it starts. So it may be better to take regular doses of pain medicine and not wait until the pain gets bad. · Medicine can help control pain. But this doesn't mean you'll have no pain. Medicine works to keep the pain at a level you can live with. With time, you will feel better. Follow-up care is a key part of your treatment and safety. Be sure to make and go to all appointments, and call your doctor if you are having problems. It's also a good idea to know your test results and keep a list of the medicines you take. How can you care for yourself at home? · Be safe with medicines. Read and follow all instructions on the label. ? If the doctor gave you a prescription medicine for pain, take it as prescribed. ? If you are not taking a prescription pain medicine, ask your doctor if you can take an over-the-counter medicine. · If you take an over-the-counter pain medicine, such as acetaminophen (Tylenol), ibuprofen (Advil, Motrin), or naproxen (Aleve), read and follow all instructions on the label. · Do not take two or more pain medicines at the same time unless the doctor told you to. · Do not drink alcohol while you are taking pain medicines. · Try to walk each day if your doctor recommends it. Start by walking a little more than you did the day before. Bit by bit, increase the amount you walk. Walking increases blood flow. It also helps prevent pneumonia and constipation. · To prevent constipation from opioid pain medicines: 
? Talk to your doctor about a laxative. ? Include fruits, vegetables, beans, and whole grains in your diet each day. These foods are high in fiber. ? Drink plenty of fluids, enough so that your urine is light yellow or clear like water. Drink water, fruit juice, or other drinks that do not contain caffeine or alcohol. If you have kidney, heart, or liver disease and have to limit fluids, talk with your doctor before you increase the amount of fluids you drink. ? Take a fiber supplement, such as Citrucel or Metamucil, every day if needed. Read and follow all instructions on the label. If you take pain medicine for more than a few days, talk to your doctor before you take fiber. When should you call for help? Call your doctor now or seek immediate medical care if: 
  · Your pain gets worse. · Your pain is not controlled by medicine. Watch closely for changes in your health, and be sure to contact your doctor if you have any problems. Where can you learn more? Go to http://verito-ann marie.info/. Enter ([phone] in the search box to learn more about \"Acute Pain After Surgery: Care Instructions. \" Current as of: September 23, 2018 Content Version: 11.9 © 6055-2495 [employer]. Care instructions adapted under license by [employer] (which disclaims liability or warranty for this information). If you have questions about a medical condition or this instruction, always ask your healthcare professional. [place] [address] any warranty or liability for your use of this information.

## 2020-04-15 NOTE — PROGRESS NOTES
Patient: Jony Lombardo                MRN: [id number]       SSN: [id number]  YOB: 2000                 AGE: 23 y.o. SEX: male    PCP:None      Chief Complaint:   Chief Complaint   Patient presents with    Ankle Pain     left         DOS: 3/19/20      HPI:     The patient is a 23 y.o. male who presents today for follow up 27 days s/p: Open reduction internal fixation left ankle fracture, syndesmotic stabilization. Since we last saw the patient in the office, the patient has been NWB to the left lower extremity. Patient denies any fever, chills, chest pain, shortness of breath or calf pain. There are no new illness or injuries to report since last seen in the office. Patient was previously prescribed ASA for DVT prophylaxis. Patient states that the pain medicine is not controlling his pain very while. Constipation has not been a concern. No changes in medications, allergies, social or family history. Patient reports doing well other than her pain assessment indicators as listed below. Pain Assessment  4/15/2020   Location of Pain Ankle   Location Modifiers Left   Severity of Pain 3   Quality of Pain Dull;Aching   Quality of Pain Comment -   Duration of Pain -   Frequency of Pain Intermittent   Aggravating Factors Other (Comment)   Aggravating Factors Comment -   Limiting Behavior Yes   Relieving Factors -   Relieving Factors Comment -   Result of Injury -   Work-Related Injury -   Type of Injury -   Type of Injury Comment -         PHYSICAL EXAM:     Visit Vitals  Ht 6' 4\" (1.93 m)   BMI 30.43 kg/m²         Pain Scale: 3/10    Pain Location:       GEN:  Alert, well developed, well nourished, well appearing 23 y.o. male seated comfortably in no acute distress. Speech normal in context and clarity. Psychiatric: Affect, mood and conduct are appropriate.  Alert, oriented x 3 alert, oriented x 3, memory grossly intact, no dementia  Patient arrives to office via: with assistive device: crutches   Patient accompanied in the examination room by: self    M/S EXAMINATION OF: right foot/ankle  DRESSINGS: CDI other than mild soilage on dressing   DRAINAGE: none  INCISION: Incision looks good, skin well approximated, no dehiscence, skin staples in place without disruption. SKIN: mild edema, no erythema, no ecchymosis, no warmth    TENDERNESS:  mild tenderness to palpation   NEUROVASCULAR:  grossly intact. Positive distal pulses and capillary refill. DVT ASSESSMENT:  The calf is not tender to palpation. No evidence of DVT seen on physical exam.  ROM: not tested                  RADIOGRAPHS & DIAGNOSTIC STUDIES     X-rays of the right ankle (AP, LAT, OB) completed 4/15/2020 at [address], 3 views of the right ankle reveals post op changes s/p ORIF right ankle with syndesmotic stabilization. There appears to be no further widening of the medial space when compared to x-rays taken on 3/30/20. Hardware is still in good position with no indication of movement or failure. Soft tissue swelling is moderately noted. Degenerative changes are not noted. Mineralization suggests no osteopenia. Calcified vessels are not present. IMPRESSION:     Encounter Diagnoses     ICD-10-CM ICD-9-CM   1. Closed displaced fracture of lateral malleolus of left fibula with routine healing, subsequent encounter S82. 62XD V54.19   2. Syndesmotic ankle sprain, left, initial encounter S93.432A 845.03   3. Post-operative state Z98.890 V45.89       PLAN:       Orders Placed This Encounter    AMB POC XRAY, ANKLE; COMPLETE, 3+ VIE     ASK ALL FEMALE PATIENTS IF THEY ARE PREGNANT     Order Specific Question:   Reason for Exam     Answer:   PAIN                   · Continue activity modification    · Weight bearing status:  no weight bearing to the surgical extremity    · No lifting, twisting, squatting, deep bending, driving or strenuous activity.     · Please follow up as instructed    · Please take medication as directed    · Follow RICE protocol (protecting skin)    · Maintain proper Nutrition    · Take a multivitamin, calcium + Vitamin D supplement daily (if tolerated)    · If you are a current smoker, quit or limit smoking    · Keep the current dressings on and in place. You need to keep this incision clean and dry. If you have a splint or cast on please keep this clean and dry as well. · You should expect swelling and bruising in the area over the next several days. You may elevate the surgical extremity on 1 pillow. Place the pillow horizontal so that no pressure is on the back of your heel. You may apply an icepack on top of the dressing to help minimize the swelling. PLEASE SEEK IMMEDIATE ASSESSMENT BY ER PHYSICIAN IF ANY OF THE FOLLOWING EXIST:      Excessive pain, swelling, redness or odor of or around the surgical area    Temperature over 100.5    Nausea and vomiting lasting longer than 4 hours or if unable to take medications    Any signs of decreased circulation or nerve impairment to extremity: change in color, persistent numbness, tingling, coldness or increase pain    If any calf pain, calf tightness, shortness of breath, chest pain    Any difficulty breathing at rest or with ambulation, any chest tightness/soreness   Severe intractable pain, persistent swelling or drainage, development of a wound, incisional redness, finger/toe swelling or color changes, or CALF PAIN    · Perform ankle pumps with non-surgical/non-injured extremity to help reduce the risk of blood clots    · Keep all pets away from  any wound present in order to prevent infection. Douglas Rojas presents today for post operative follow up and pain that is secondary to post operative state. Since surgery, the patient's pain has not changed. Patient describes the pain as aching, pulsating, throbbing. Since surgery, the patient is not able to perform normal daily activities. Patient reports the following adverse side effects from treatment: none. Today - Pain Scale: 3/10    Prior records: N/A - post op  Personal or family history of psychiatric, addiction, or substance abuse: no    Aberrant behaviors: None.  reviewed: n/a. Josue Roldan has been consulted during this visit and he agrees with the assessment and plan. Dr. Turner Roldan has had a long discussion with the patient regarding his x-rays. Patient needs to be 100% compliant with NWB instructions. Dr. Turner Roldan has informed him that he wants to see him back in 1 week for repeat x-rays. If the x-rays do not look good at that time, then he may need to go back to surgery for revision.  reviewed    Patient expresses understanding of the plan. Patient education provided on post surgical care. REVIEW OF SYSTEMS:     Review of Systems: Chest pain: no  Shortness of breath: no  Fever: no  Night sweats: no  Weight loss: no  Constitutional signs: no  Review of all other systems is negative    Otherwise as noted in HPI      PAST MEDICAL HISTORY:     Past Medical History:   Diagnosis Date    Body piercing     Nose    Marijuana use     Last used on 3/9/20    Orthodontics        MEDICATIONS:     Current Outpatient Medications   Medication Sig    oxyCODONE-acetaminophen (Percocet) 5-325 mg per tablet Take 1-2 Tabs by mouth every eight (8) hours as needed for Pain for up to 7 days. Max Daily Amount: 6 Tabs.  aspirin (ASPIRIN) 325 mg tablet Take 1 Tab by mouth daily.  promethazine (PHENERGAN) 25 mg tablet Take 1 Tab by mouth every six (6) hours as needed for Nausea.  polyethylene glycol (Miralax) 17 gram packet Take 1 Packet by mouth daily. No current facility-administered medications for this visit.         ALLERGIES:     Allergies   Allergen Reactions    Seasonale [Levonorgestrel-Ethinyl Estrad] Sneezing    Shellfish Derived Swelling     Throat swells up          PAST SURGICAL HISTORY:     Past Surgical History:   Procedure Laterality Date    HX ANKLE FRACTURE TX         SOCIAL HISTORY:     Social History     Socioeconomic History    Marital status: UNKNOWN     Spouse name: Not on file    Number of children: Not on file    Years of education: Not on file    Highest education level: Not on file   Occupational History    Not on file   Social Needs    Financial resource strain: Not on file    Food insecurity     Worry: Not on file     Inability: Not on file    Transportation needs     Medical: Not on file     Non-medical: Not on file   Tobacco Use    Smoking status: Never Smoker    Smokeless tobacco: Never Used   Substance and Sexual Activity    Alcohol use: Not on file    Drug use: Yes     Types: Marijuana     Comment: last used on 3/9/20    Sexual activity: Not on file   Lifestyle    Physical activity     Days per week: Not on file     Minutes per session: Not on file    Stress: Not on file   Relationships    Social connections     Talks on phone: Not on file     Gets together: Not on file     Attends [employer] service: Not on file     Active member of club or organization: Not on file     Attends meetings of clubs or organizations: Not on file     Relationship status: Not on file    Intimate partner violence     Fear of current or ex partner: Not on file     Emotionally abused: Not on file     Physically abused: Not on file     Forced sexual activity: Not on file   Other Topics Concern    Not on file   Social History Narrative    ** Merged History Encounter **            FAMILY HISTORY:     Family History   Problem Relation Age of Onset    No Known Problems Mother     No Known Problems Father     Hypertension Maternal Grandmother     Diabetes Maternal [id number] [facility] A Jesu Jain PA-C  4/15/2020

## 2020-04-24 ENCOUNTER — TELEPHONE (OUTPATIENT)
Dept: ORTHOPEDIC SURGERY | Age: 20
End: 2020-04-24

## 2020-04-24 DIAGNOSIS — S82.62XD CLOSED DISPLACED FRACTURE OF LATERAL MALLEOLUS OF LEFT FIBULA WITH ROUTINE HEALING, SUBSEQUENT ENCOUNTER: Primary | ICD-10-CM

## 2020-04-24 RX ORDER — OXYCODONE AND ACETAMINOPHEN 5; 325 MG/1; MG/1
1 TABLET ORAL
Qty: 21 TAB | Refills: 0 | Status: SHIPPED | OUTPATIENT
Start: 2020-04-24 | End: 2020-04-29 | Stop reason: SDUPTHER

## 2020-04-24 NOTE — TELEPHONE ENCOUNTER
Spoke with a female, instructed her to have Mr. Marques Sham call back if he has any questions with his medication.

## 2020-04-24 NOTE — TELEPHONE ENCOUNTER
Pain medication is being weaned. Prescription for the following medication e-prescribed to the patients pharmacy:    Orders Placed This Encounter    oxyCODONE-acetaminophen (Percocet) 5-325 mg per tablet     Sig: Take 1 Tab by mouth every eight (8) hours as needed for Pain for up to 7 days. Max Daily Amount: 3 Tabs.      Dispense:  21 Tab     Refill:  0           Corrina Velásquez PA-C  4/24/2020  10:26 AM

## 2020-04-29 ENCOUNTER — TELEPHONE (OUTPATIENT)
Dept: ORTHOPEDIC SURGERY | Age: 20
End: 2020-04-29

## 2020-04-29 DIAGNOSIS — S82.62XD CLOSED DISPLACED FRACTURE OF LATERAL MALLEOLUS OF LEFT FIBULA WITH ROUTINE HEALING, SUBSEQUENT ENCOUNTER: ICD-10-CM

## 2020-04-29 RX ORDER — FAMOTIDINE 40 MG/1
40 TABLET, FILM COATED ORAL DAILY
Qty: 30 TAB | Refills: 1 | Status: SHIPPED | OUTPATIENT
Start: 2020-04-29

## 2020-04-29 RX ORDER — IBUPROFEN 800 MG/1
800 TABLET ORAL
Qty: 90 TAB | Refills: 1 | Status: SHIPPED | OUTPATIENT
Start: 2020-04-29 | End: 2020-08-03 | Stop reason: SDUPTHER

## 2020-04-29 RX ORDER — OXYCODONE AND ACETAMINOPHEN 5; 325 MG/1; MG/1
1 TABLET ORAL
Qty: 21 TAB | Refills: 0 | Status: SHIPPED | OUTPATIENT
Start: 2020-05-01 | End: 2020-05-11 | Stop reason: SDUPTHER

## 2020-04-29 NOTE — TELEPHONE ENCOUNTER
Patient is 7 weeks s/p surgery and he needs to take the medication as prescribed and wean down usage. Will send rx to pharmacy for Motrin 800 mg tab and Pepcid. I will send refill for Percocet to pharmacy, but they will not refill the rx until 5/1/20 and medication will continue to be weaned. Prescription for the following medication e-prescribed to the patients pharmacy:    Orders Placed This Encounter    oxyCODONE-acetaminophen (Percocet) 5-325 mg per tablet     Sig: Take 1 Tab by mouth every eight (8) hours as needed for Pain for up to 7 days. Max Daily Amount: 3 Tabs. Dispense:  21 Tab     Refill:  0    ibuprofen (MOTRIN) 800 mg tablet     Sig: Take 1 Tab by mouth every eight (8) hours as needed for Pain. Dispense:  90 Tab     Refill:  1    famotidine (PEPCID) 40 mg tablet     Sig: Take 1 Tab by mouth daily.      Dispense:  30 Tab     Refill:  1           Shauna Goldman PA-C  4/29/2020  10:36 AM    Shauna Goldman PA-C  4/29/2020  10:29 AM

## 2020-04-29 NOTE — TELEPHONE ENCOUNTER
Post op patient     Patient is requesting refill of oxycodone. Patient is out of medication as of 2 days ago. States he's taking 2 tabs every 8 hours instead of the prescribed 1 tab every 8 hours.     Patient reports severe pain    Pharmacy: [facility] [address] [place] [address][phone]

## 2020-05-06 RX ORDER — ASPIRIN 325 MG
TABLET ORAL
Qty: 30 TAB | Refills: 0 | Status: SHIPPED | OUTPATIENT
Start: 2020-05-06 | End: 2020-06-01

## 2020-05-11 ENCOUNTER — OFFICE VISIT (OUTPATIENT)
Dept: ORTHOPEDIC SURGERY | Age: 20
End: 2020-05-11

## 2020-05-11 VITALS
HEIGHT: 76 IN | OXYGEN SATURATION: 97 % | SYSTOLIC BLOOD PRESSURE: 143 MMHG | RESPIRATION RATE: 16 BRPM | HEART RATE: 79 BPM | BODY MASS INDEX: 30.43 KG/M2 | TEMPERATURE: 96.4 F | DIASTOLIC BLOOD PRESSURE: 86 MMHG

## 2020-05-11 DIAGNOSIS — S82.62XD CLOSED DISPLACED FRACTURE OF LATERAL MALLEOLUS OF LEFT FIBULA WITH ROUTINE HEALING, SUBSEQUENT ENCOUNTER: ICD-10-CM

## 2020-05-11 DIAGNOSIS — M25.572 ACUTE LEFT ANKLE PAIN: Primary | ICD-10-CM

## 2020-05-11 DIAGNOSIS — Z98.890 POST-OPERATIVE STATE: ICD-10-CM

## 2020-05-11 RX ORDER — CEPHALEXIN 500 MG/1
500 CAPSULE ORAL 4 TIMES DAILY
Qty: 28 CAP | Refills: 0 | Status: SHIPPED | OUTPATIENT
Start: 2020-05-11 | End: 2020-05-18

## 2020-05-11 RX ORDER — OXYCODONE AND ACETAMINOPHEN 5; 325 MG/1; MG/1
1 TABLET ORAL
Qty: 14 TAB | Refills: 0 | Status: SHIPPED | OUTPATIENT
Start: 2020-05-11 | End: 2020-05-18

## 2020-05-11 NOTE — PATIENT INSTRUCTIONS
· Continue activity modification · Weight bearing status:  no weight bearing to the surgical extremity · No lifting, twisting, squatting, deep bending, driving or strenuous activity. · Please follow up as instructed · Please take medication as directed · Follow RICE protocol (protecting skin) · Maintain proper Nutrition · Take a multivitamin, calcium + Vitamin D supplement daily (if tolerated) · If you are a current smoker, quit or limit smoking · Keep the current dressings on and in place. You need to keep this incision clean and dry. If you have a splint or cast on please keep this clean and dry as well. · You should expect swelling and bruising in the area over the next several days. You may elevate the surgical extremity on 1 pillow. Place the pillow horizontal so that no pressure is on the back of your heel. You may apply an icepack on top of the dressing to help minimize the swelling. PLEASE SEEK IMMEDIATE ASSESSMENT BY ER PHYSICIAN IF ANY OF THE FOLLOWING EXIST:  
 
? Excessive pain, swelling, redness or odor of or around the surgical area ? Temperature over 100.5 ? Nausea and vomiting lasting longer than 4 hours or if unable to take medications ? Any signs of decreased circulation or nerve impairment to extremity: change in color, persistent numbness, tingling, coldness or increase pain ? If any calf pain, calf tightness, shortness of breath, chest pain ? Any difficulty breathing at rest or with ambulation, any chest tightness/soreness ? Severe intractable pain, persistent swelling or drainage, development of a wound, incisional redness, finger/toe swelling or color changes, or CALF PAIN 
 
· Perform ankle pumps with non-surgical/non-injured extremity to help reduce the risk of blood clots · Keep all pets away from  any wound present in order to prevent infection. Acute Pain After Surgery: Care Instructions Your Care Instructions It's common to have some pain after surgery. Pain doesn't mean that something is wrong or that the surgery didn't go well. But when the pain is severe, it's important to work with your doctor to manage it. It's also important to be aware of a few facts about pain and pain medicine. · You are the only person who knows what your pain feels like. So be sure to tell your doctor when you are in pain or when the pain changes. Then he or she will know how to adjust your medicines. · Pain is often easier to control right after it starts. So it may be better to take regular doses of pain medicine and not wait until the pain gets bad. · Medicine can help control pain. But this doesn't mean you'll have no pain. Medicine works to keep the pain at a level you can live with. With time, you will feel better. Follow-up care is a key part of your treatment and safety. Be sure to make and go to all appointments, and call your doctor if you are having problems. It's also a good idea to know your test results and keep a list of the medicines you take. How can you care for yourself at home? · Be safe with medicines. Read and follow all instructions on the label. ? If the doctor gave you a prescription medicine for pain, take it as prescribed. ? If you are not taking a prescription pain medicine, ask your doctor if you can take an over-the-counter medicine. · If you take an over-the-counter pain medicine, such as acetaminophen (Tylenol), ibuprofen (Advil, Motrin), or naproxen (Aleve), read and follow all instructions on the label. · Do not take two or more pain medicines at the same time unless the doctor told you to. · Do not drink alcohol while you are taking pain medicines. · Try to walk each day if your doctor recommends it. Start by walking a little more than you did the day before. Bit by bit, increase the amount you walk. Walking increases blood flow. It also helps prevent pneumonia and constipation. · To prevent constipation from opioid pain medicines: 
? Talk to your doctor about a laxative. ? Include fruits, vegetables, beans, and whole grains in your diet each day. These foods are high in fiber. ? Drink plenty of fluids, enough so that your urine is light yellow or clear like water. Drink water, fruit juice, or other drinks that do not contain caffeine or alcohol. If you have kidney, heart, or liver disease and have to limit fluids, talk with your doctor before you increase the amount of fluids you drink. ? Take a fiber supplement, such as Citrucel or Metamucil, every day if needed. Read and follow all instructions on the label. If you take pain medicine for more than a few days, talk to your doctor before you take fiber. When should you call for help? Call your doctor now or seek immediate medical care if: 
  · Your pain gets worse. · Your pain is not controlled by medicine. Watch closely for changes in your health, and be sure to contact your doctor if you have any problems. Where can you learn more? Go to http://verito-ann marie.info/. Enter ([phone] [phone] in the search box to learn more about \"Acute Pain After Surgery: Care Instructions. \" Current as of: September 23, 2018 Content Version: 11.9 © 0675-9990 [employer]. Care instructions adapted under license by [employer] (which disclaims liability or warranty for this information). If you have questions about a medical condition or this instruction, always ask your healthcare professional. [place] [address] any warranty or liability for your use of this information.

## 2020-05-11 NOTE — PROGRESS NOTES
Patient: Padmaja Mckeon                MRN: [id number]       SSN: [id number]  YOB: 2000                 AGE: 23 y.o. SEX: male    PCP:None      Chief Complaint:   Chief Complaint   Patient presents with    Ankle Pain     left ankle post op          DOS: 3/19/20      HPI:     The patient is a 23 y.o. male who presents today for follow up 53 days s/p: Open reduction internal fixation left ankle fracture, syndesmotic stabilization. Since we last saw the patient in the office, the patient has been NWB to the left lower extremity. Patient states that his cast got wet in the shower. Patient denies any fever, chills, chest pain, shortness of breath or calf pain. There are no new illness or injuries to report since last seen in the office. Patient was previously prescribed ASA for DVT prophylaxis. Patient states that the pain medicine is controlling his pain off/on. Constipation has not been a concern. No changes in medications, allergies, social or family history. Patient reports doing well other than her pain assessment indicators as listed below. Pain Assessment  5/11/2020   Location of Pain Ankle   Location Modifiers Left   Severity of Pain 3   Quality of Pain Throbbing; Jean [place] Peeks; Aching   Quality of Pain Comment -   Duration of Pain Persistent   Frequency of Pain Constant   Aggravating Factors -   Aggravating Factors Comment -   Limiting Behavior Yes   Relieving Factors Rest;Elevation   Relieving Factors Comment -   Result of Injury No   Work-Related Injury -   Type of Injury -   Type of Injury Comment -         PHYSICAL EXAM:     Visit Vitals  /86   Pulse 79   Temp 96.4 °F (35.8 °C) (Temporal)   Resp 16   Ht 6' 4\" (1.93 m)   SpO2 97%   BMI 30.43 kg/m²         Pain Scale: 3/10    Pain Location:       GEN:  Alert, well developed, well nourished, well appearing 23 y.o. male seated comfortably in no acute distress.  Speech normal in context and clarity. Psychiatric: Affect, mood and conduct are appropriate. Alert, oriented x 3 alert, oriented x 3, memory grossly intact, no dementia  Patient arrives to office via: with assistive device: crutches   Patient accompanied in the examination room by: self    M/S EXAMINATION OF: right foot/ankle  DRESSINGS: CDI other than mild soilage on dressing   DRAINAGE: none  INCISION: Incision looks good, skin well approximated, no dehiscence, skin has some mild maceration from moisture (from getting wet in the shower). Skin was cleaned and patient placed on ABX. SKIN: mild edema, no erythema, no ecchymosis, no warmth, skin is dry and peeling  TENDERNESS:  mild tenderness to palpation   NEUROVASCULAR:  grossly intact. Positive distal pulses and capillary refill. DVT ASSESSMENT:  The calf is not tender to palpation. No evidence of DVT seen on physical exam.  ROM: not tested                  RADIOGRAPHS & DIAGNOSTIC STUDIES     X-rays of the left ankle (AP, LAT, OB) completed 5/11/20 at [address], 3 views of the left ankle reveals post op changes s/p ORIF left ankle with syndesmotic stabilization. There appears to be no further widening of the medial space when compared to x-rays taken previously. Hardware is still in good position with no indication of movement or failure. Soft tissue swelling is moderately noted. Degenerative changes are not noted. Mineralization suggests no osteopenia. Calcified vessels are not present. IMPRESSION:     Encounter Diagnoses     ICD-10-CM ICD-9-CM   1. Acute left ankle pain M25.572 719.47   2. Closed displaced fracture of lateral malleolus of left fibula with routine healing, subsequent encounter S82. 62XD V54.19   3. Post-operative state Z98.890 V45.89       PLAN:       Orders Placed This Encounter    [08102] Ankle Min 3V     Order Specific Question:   Weight bearing?      Answer:   No    cephALEXin (KEFLEX) 500 mg capsule     Sig: Take 1 Cap by mouth four (4) times daily for 7 days. Dispense:  28 Cap     Refill:  0    oxyCODONE-acetaminophen (Percocet) 5-325 mg per tablet     Sig: Take 1 Tab by mouth two (2) times daily as needed for Pain for up to 7 days. Max Daily Amount: 2 Tabs. Dispense:  14 Tab     Refill:  0                   · Continue activity modification    · Weight bearing status:  no weight bearing to the surgical extremity    · Short leg cast for 3 more weeks and then a CAM boot    · No lifting, twisting, squatting, deep bending, driving or strenuous activity. · Please follow up as instructed    · Please take medication as directed    · Follow RICE protocol (protecting skin)    · Maintain proper Nutrition    · Take a multivitamin, calcium + Vitamin D supplement daily (if tolerated)    · If you are a current smoker, quit or limit smoking    · Keep the current dressings on and in place. You need to keep this incision clean and dry. If you have a splint or cast on please keep this clean and dry as well. · You should expect swelling and bruising in the area over the next several days. You may elevate the surgical extremity on 1 pillow. Place the pillow horizontal so that no pressure is on the back of your heel. You may apply an icepack on top of the dressing to help minimize the swelling.      PLEASE SEEK IMMEDIATE ASSESSMENT BY ER PHYSICIAN IF ANY OF THE FOLLOWING EXIST:      Excessive pain, swelling, redness or odor of or around the surgical area    Temperature over 100.5    Nausea and vomiting lasting longer than 4 hours or if unable to take medications    Any signs of decreased circulation or nerve impairment to extremity: change in color, persistent numbness, tingling, coldness or increase pain    If any calf pain, calf tightness, shortness of breath, chest pain    Any difficulty breathing at rest or with ambulation, any chest tightness/soreness   Severe intractable pain, persistent swelling or drainage, development of a wound, incisional redness, finger/toe swelling or color changes, or CALF PAIN    · Perform ankle pumps with non-surgical/non-injured extremity to help reduce the risk of blood clots    · Keep all pets away from  any wound present in order to prevent infection. Tova Carmichael presents today for post operative follow up and pain that is secondary to post operative state. Since surgery, the patient's pain has not changed. Patient describes the pain as aching, pulsating, throbbing. Since surgery, the patient is not able to perform normal daily activities. Patient reports the following adverse side effects from treatment: none. Today - Pain Scale: 3/10    Prior records: N/A - post op  Personal or family history of psychiatric, addiction, or substance abuse: no    Aberrant behaviors: None.  reviewed: n/a. Estevan Burns has been consulted during this visit and he agrees with the assessment and plan.  reviewed    Patient expresses understanding of the plan. Patient education provided on post surgical care. REVIEW OF SYSTEMS:     Review of Systems: Chest pain: no  Shortness of breath: no  Fever: no  Night sweats: no  Weight loss: no  Constitutional signs: no  Review of all other systems is negative    Otherwise as noted in HPI      PAST MEDICAL HISTORY:     Past Medical History:   Diagnosis Date    Body piercing     Nose    Marijuana use     Last used on 3/9/20    Orthodontics        MEDICATIONS:     Current Outpatient Medications   Medication Sig    cephALEXin (KEFLEX) 500 mg capsule Take 1 Cap by mouth four (4) times daily for 7 days.  oxyCODONE-acetaminophen (Percocet) 5-325 mg per tablet Take 1 Tab by mouth two (2) times daily as needed for Pain for up to 7 days. Max Daily Amount: 2 Tabs.  aspirin (ASPIRIN) 325 mg tablet TAKE 1 TABLET BY MOUTH EVERY DAY    ibuprofen (MOTRIN) 800 mg tablet Take 1 Tab by mouth every eight (8) hours as needed for Pain.     famotidine (PEPCID) 40 mg tablet Take 1 Tab by mouth daily.  promethazine (PHENERGAN) 25 mg tablet Take 1 Tab by mouth every six (6) hours as needed for Nausea.  polyethylene glycol (Miralax) 17 gram packet Take 1 Packet by mouth daily. No current facility-administered medications for this visit.         ALLERGIES:     Allergies   Allergen Reactions    Seasonale [Levonorgestrel-Ethinyl Estrad] Sneezing    Shellfish Derived Swelling     Throat swells up          PAST SURGICAL HISTORY:     Past Surgical History:   Procedure Laterality Date    HX ANKLE FRACTURE TX         SOCIAL HISTORY:     Social History     Socioeconomic History    Marital status: UNKNOWN     Spouse name: Not on file    Number of children: Not on file    Years of education: Not on file    Highest education level: Not on file   Occupational History    Not on file   Social Needs    Financial resource strain: Not on file    Food insecurity     Worry: Not on file     Inability: Not on file    Transportation needs     Medical: Not on file     Non-medical: Not on file   Tobacco Use    Smoking status: Never Smoker    Smokeless tobacco: Never Used   Substance and Sexual Activity    Alcohol use: Not on file    Drug use: Yes     Types: Marijuana     Comment: last used on 3/9/20    Sexual activity: Not on file   Lifestyle    Physical activity     Days per week: Not on file     Minutes per session: Not on file    Stress: Not on file   Relationships    Social connections     Talks on phone: Not on file     Gets together: Not on file     Attends Latter-day service: Not on file     Active member of club or organization: Not on file     Attends meetings of clubs or organizations: Not on file     Relationship status: Not on file    Intimate partner violence     Fear of current or ex partner: Not on file     Emotionally abused: Not on file     Physically abused: Not on file     Forced sexual activity: Not on file   Other Topics Concern    Not on file   Social History Narrative    ** Merged History Encounter **            FAMILY HISTORY:     Family History   Problem Relation Age of Onset    No Known Problems Mother     No Known Problems Father     Hypertension Maternal Grandmother     Diabetes Maternal Grandmother            Reuben Sandoval PA-C  5/11/2020

## 2020-05-21 ENCOUNTER — TELEPHONE (OUTPATIENT)
Dept: ORTHOPEDIC SURGERY | Age: 20
End: 2020-05-21

## 2020-05-21 DIAGNOSIS — S82.62XD CLOSED DISPLACED FRACTURE OF LATERAL MALLEOLUS OF LEFT FIBULA WITH ROUTINE HEALING, SUBSEQUENT ENCOUNTER: Primary | ICD-10-CM

## 2020-05-21 RX ORDER — ACETAMINOPHEN AND CODEINE PHOSPHATE 300; 30 MG/1; MG/1
1 TABLET ORAL
Qty: 20 TAB | Refills: 0 | Status: SHIPPED | OUTPATIENT
Start: 2020-05-21 | End: 2020-05-26

## 2020-05-21 NOTE — TELEPHONE ENCOUNTER
Request for refill of Percocet denied as patient has been advised that the medication would be weaned. Prescription for the following medication e-prescribed to the patients pharmacy:    Orders Placed This Encounter    acetaminophen-codeine (Tylenol-Codeine #3) 300-30 mg per tablet     Sig: Take 1 Tab by mouth every six (6) hours as needed for Pain for up to 5 days. Max Daily Amount: 4 Tabs.      Dispense:  20 Tab     Refill:  0           Jaelyn Queen[address]  5/21/2020  12:03 PM

## 2020-05-21 NOTE — TELEPHONE ENCOUNTER
Post op left ankle displaced fx:     Pt requesting refill of his pain medication Oxycodone 7.5/325mg    Pharmacy: [address] [place] [address]    Pt p#[phone](must dial [phone])

## 2020-05-21 NOTE — TELEPHONE ENCOUNTER
Patient called stating that his pharmacy told him he needs a prior authorization for his medication. He asked if this can be done.  Please advise patient at [phone]

## 2020-05-22 NOTE — TELEPHONE ENCOUNTER
Pharmacy notified via phone. I contacted patient, name and  were verified, and advised him of this. Patient verbalized understanding.

## 2020-05-22 NOTE — TELEPHONE ENCOUNTER
Prior auth submitted through covermymeds. Waiting for response from insurance. Key: [id number]    Please do not close message.

## 2020-05-22 NOTE — TELEPHONE ENCOUNTER
I am not familiar with which form to choose for this medication. I called the pharmacy and got the card info.     [address] # [place] [address]  PCN # - [place]  ID # - [id number]

## 2020-06-01 ENCOUNTER — OFFICE VISIT (OUTPATIENT)
Dept: ORTHOPEDIC SURGERY | Age: 20
End: 2020-06-01

## 2020-06-01 VITALS — TEMPERATURE: 98.2 F | HEIGHT: 76 IN | WEIGHT: 250 LBS | BODY MASS INDEX: 30.44 KG/M2

## 2020-06-01 DIAGNOSIS — S82.62XD CLOSED DISPLACED FRACTURE OF LATERAL MALLEOLUS OF LEFT FIBULA WITH ROUTINE HEALING, SUBSEQUENT ENCOUNTER: ICD-10-CM

## 2020-06-01 DIAGNOSIS — Z98.890 POST-OPERATIVE STATE: Primary | ICD-10-CM

## 2020-06-01 RX ORDER — ASPIRIN 325 MG
TABLET ORAL
Qty: 30 TAB | Refills: 0 | Status: SHIPPED | OUTPATIENT
Start: 2020-06-01

## 2020-06-01 NOTE — PROGRESS NOTES
AMBULATORY PROGRESS NOTE      Patient: Santos Tovar             MRN: [id number]     SSN: [id number] Body mass index is 30.43 kg/m². YOB: 2000     AGE: 23 y.o. EX: male    PCP: None       IMPRESSION //  DIAGNOSIS AND TREATMENT PLAN      DIAGNOSES  1. Post-operative state    2. Closed displaced fracture of lateral malleolus of left fibula with routine healing, subsequent encounter        Orders Placed This Encounter    Generic Supply Order     Tall CAM Boot    [id number] Ankle Min 3V     Order Specific Question:   Weight bearing? Answer: No                HPI //  OBJECTIVE EXAMINATION       Santos Tovar IS A 23 y.o. male who presents to my outpatient office for evaluation [facility]:  [facility]    Patient here for follow-up being treated for incorrect fixation, left ankle, syndesmotic injury, and I spiral fibula fracture. I even in a short leg cast is been nonweightbearing. His cast was removed today, and over the inferior portion of the surgical incision, he has about a 1 cm area that is pink and thickened there is no pus or expressible drainage no malodor this area has not fully healed. His calf is soft nontender is no to DVT at the immobilized. X-rays today, show anatomic alignment ankle fibula plate to Arthrex suture tight ropes are in place in good position. My plan for him to be nonweightbearing, he will cleanse the foot with a dermal spray once a day, apply Aquasol dressings every other day even for 2 days then take it off and replace it. I will see him in 1 week to recheck him. He remained nonweightbearing as set above as dictated above next    Otherwise he has functional range of motion ankle no instability his calf is soft nontender no DVT or thrombi is. He is nontender medially nontender proximal fibula. I do not perform any stress testing on him at this current time. CHART REVIEW     There is no problem list on file for this patient.        Joaquina Champagne Ginger Darby has been experiencing pain and discomfort confirmed as outlined in the pain assessment outlined below. Pain Assessment  6/1/2020   Location of Pain Ankle   Location Modifiers Left   Severity of Pain 0   Quality of Pain -   Quality of Pain Comment -   Duration of Pain -   Frequency of Pain -   Aggravating Factors -   Aggravating Factors Comment -   Limiting Behavior -   Relieving Factors -   Relieving Factors Comment -   Result of Injury -   Work-Related Injury -   Type of Injury -   Type of Injury Comment -        Michaelsandra Darby  has a past medical history of Body piercing, Marijuana use, and Orthodontics. Patients is employed at:       No results found for: HBA1C, HGBE8, XRP1TUGL     Lab Results   Component Value Date/Time    Glucose 82 03/17/2020 11:30 AM        No results found for: HBA1C, QXT2CMCT, HGBE8, KCA1BKXW, RKO4GOWR      No results found for: VITD3, XQVID2, XQVID3, XQVID, VD3RIA, [id number]    Past Medical History:   Diagnosis Date    Body piercing     Nose    Marijuana use     Last used on 3/9/20    Orthodontics      Current Outpatient Medications   Medication Sig    aspirin (ASPIRIN) 325 mg tablet TAKE 1 TABLET BY MOUTH EVERY DAY    ibuprofen (MOTRIN) 800 mg tablet Take 1 Tab by mouth every eight (8) hours as needed for Pain.  famotidine (PEPCID) 40 mg tablet Take 1 Tab by mouth daily.  promethazine (PHENERGAN) 25 mg tablet Take 1 Tab by mouth every six (6) hours as needed for Nausea.  polyethylene glycol (Miralax) 17 gram packet Take 1 Packet by mouth daily. No current facility-administered medications for this visit. THE  FOR Ronda Deng  WAS REVIEWED BY Nidia Quintanilla MD 6/1/2020 .    Allergies   Allergen Reactions    Seasonale [Levonorgestrel-Ethinyl Estrad] Sneezing    Shellfish Derived Swelling     Throat swells up      Past Surgical History:   Procedure Laterality Date    HX ANKLE FRACTURE TX       Social History     Occupational History  Not on file   Tobacco Use    Smoking status: Never Smoker    Smokeless tobacco: Never Used   Substance and Sexual Activity    Alcohol use: Not on file    Drug use: Yes     Types: Marijuana     Comment: last used on 3/9/20    Sexual activity: Not on file     Family History   Problem Relation Age of Onset    No Known Problems Mother     No Known Problems Father     Hypertension Maternal Grandmother     Diabetes Maternal Grandmother         REVIEW OF SYSTEMS : 6/1/2020  ALL BELOW ARE Negative except : SEE HPI     Constitutional: Negative for fever, chills and weight loss. Neg Weight Loss  Cardiovascular: Negative for chest pain, claudication and leg swelling. SOB, RUTH   Gastrointestinal/Urological: Negative for pain, N/V/D/C, Blood in stool or urine,dysuria,  Hematuria, Incontinence  Musculoskeletal: see HPI. Neurological: Negative for dizziness and weakness, headaches,Visual Changes or   Confusion, or Seizures,   Psychiatric/Behavioral: Negative for depression, memory loss and substance abuse. Extremities:  Negative for hair changes, rash or skin lesion changes. Hematologic: Negative for Bleeding problems, bruising, pallor or swollen lymph nodes. Peripheral Vascular: No calf pain, vascular vein tenderness to calf pain// No calf throbbing, posterior knee throbbing pain     DIAGNOSTIC IMAGING      Please see above section of this report. I have personally reviewed the results of the above study. The interpretation of this study is my professional opinion.       Yasir Mckeon MD  6/1/2020  11:50 AM

## 2020-06-01 NOTE — PROGRESS NOTES
Patient: Supriya Acuna                MRN: [id number]       SSN: [id number] YOB: 2000                 AGE: 23 y.o. SEX: male PCP:None Chief Complaint:  
Chief Complaint Patient presents with  Ankle Pain  
  left ankle pain DOS: 3/19/20 HPI:  
 
The patient is a 23 y.o. male who presents today for follow up 76 days s/p: Open reduction internal fixation left ankle fracture, syndesmotic stabilization. Since we last saw the patient in the office, the patient has been NWB to the left lower extremity. Patient states that his cast got wet in the shower. Patient denies any fever, chills, chest pain, shortness of breath or calf pain. There are no new illness or injuries to report since last seen in the office. Patient was previously prescribed ASA for DVT prophylaxis. Patient states that the pain medicine is controlling his pain off/on. Constipation has not been a concern. No changes in medications, allergies, social or family history. Patient reports doing well other than her pain assessment indicators as listed below. Pain Assessment  6/1/2020 Location of Pain Ankle Location Modifiers Left Severity of Pain 0 Quality of Pain -  
Quality of Pain Comment - Duration of Pain - Frequency of Pain - Aggravating Factors - Aggravating Factors Comment - Limiting Behavior -  
Relieving Factors - Relieving Factors Comment - Result of Injury - Work-Related Injury - Type of Injury - Type of Injury Comment -  
 
 
 
PHYSICAL EXAM:  
 
Visit Vitals Temp 98.2 °F (36.8 °C) (Oral) Ht 6' 4\" (1.93 m) Wt 250 lb (113.4 kg) BMI 30.43 kg/m² Pain Scale: 0 - No pain/10 Pain Location:  
 
 
GEN:  Alert, well developed, well nourished, well appearing 23 y.o. male seated comfortably in no acute distress. Speech normal in context and clarity. Psychiatric: Affect, mood and conduct are appropriate. Alert, oriented x 3 alert, oriented x 3, memory grossly intact, no dementia Patient arrives to office via: with assistive device: crutches Patient accompanied in the examination room by: self M/S EXAMINATION OF: right foot/ankle DRESSINGS: CDI other than mild soilage on dressing DRAINAGE: none INCISION: Incision looks good, skin well approximated, no dehiscence, skin has some mild maceration from moisture (from getting wet in the shower). Skin was cleaned and patient placed on ABX. SKIN: mild edema, no erythema, no ecchymosis, no warmth, skin is dry and peeling TENDERNESS:  mild tenderness to palpation NEUROVASCULAR:  grossly intact. Positive distal pulses and capillary refill. DVT ASSESSMENT:  The calf is not tender to palpation. No evidence of DVT seen on physical exam. 
ROM: not tested RADIOGRAPHS & DIAGNOSTIC STUDIES X-rays of the left ankle (AP, LAT, OB) completed 5/11/20 at [facility] X-rays, 3 views of the left ankle reveals post op changes s/p ORIF left ankle with syndesmotic stabilization. There appears to be no further widening of the medial space when compared to x-rays taken previously. Hardware is still in good position with no indication of movement or failure. Soft tissue swelling is moderately noted. Degenerative changes are not noted. Mineralization suggests no osteopenia. Calcified vessels are not present. IMPRESSION:  
 
Encounter Diagnoses ICD-10-CM ICD-9-CM 1. Post-operative state Z98.890 V45.89 2. Closed displaced fracture of lateral malleolus of left fibula with routine healing, subsequent encounter S82. 62XD V54.19 PLAN:  
 
 
Orders Placed This Encounter  [72263] Ankle Min 3V Order Specific Question:   Weight bearing? Answer: No  
   
  
  
 
 
 
· Continue activity modification · Weight bearing status:  no weight bearing to the surgical extremity · Short leg cast for 3 more weeks and then a CAM boot · No lifting, twisting, squatting, deep bending, driving or strenuous activity. · Please follow up as instructed · Please take medication as directed · Follow RICE protocol (protecting skin) · Maintain proper Nutrition · Take a multivitamin, calcium + Vitamin D supplement daily (if tolerated) · If you are a current smoker, quit or limit smoking · Keep the current dressings on and in place. You need to keep this incision clean and dry. If you have a splint or cast on please keep this clean and dry as well. · You should expect swelling and bruising in the area over the next several days. You may elevate the surgical extremity on 1 pillow. Place the pillow horizontal so that no pressure is on the back of your heel. You may apply an icepack on top of the dressing to help minimize the swelling. PLEASE SEEK IMMEDIATE ASSESSMENT BY ER PHYSICIAN IF ANY OF THE FOLLOWING EXIST:  
 
? Excessive pain, swelling, redness or odor of or around the surgical area ? Temperature over 100.5 ? Nausea and vomiting lasting longer than 4 hours or if unable to take medications ? Any signs of decreased circulation or nerve impairment to extremity: change in color, persistent numbness, tingling, coldness or increase pain ? If any calf pain, calf tightness, shortness of breath, chest pain ? Any difficulty breathing at rest or with ambulation, any chest tightness/soreness ? Severe intractable pain, persistent swelling or drainage, development of a wound, incisional redness, finger/toe swelling or color changes, or CALF PAIN 
 
· Perform ankle pumps with non-surgical/non-injured extremity to help reduce the risk of blood clots · Keep all pets away from  any wound present in order to prevent infection.   
  
 
Cady Ramirez presents today for post operative follow up and pain that is secondary to post operative state. Since surgery, the patient's pain has not changed. Patient describes the pain as aching, pulsating, throbbing. Since surgery, the patient is not able to perform normal daily activities. Patient reports the following adverse side effects from treatment: none. Today - Pain Scale: 0 - No pain/10 Prior records: N/A - post op Personal or family history of psychiatric, addiction, or substance abuse: no 
 
Aberrant behaviors: None.  reviewed: n/a. Post Op Dr. Abilio Kohli has been consulted during this visit and he agrees with the assessment and plan.  reviewed Patient expresses understanding of the plan. Patient education provided on post surgical care. REVIEW OF SYSTEMS:  
 
Review of Systems: Chest pain: no Shortness of breath: no 
Fever: no 
Night sweats: no 
Weight loss: no 
Constitutional signs: no 
Review of all other systems is negative Otherwise as noted in HPI 
 
 
PAST MEDICAL HISTORY:  
 
Past Medical History:  
Diagnosis Date  Body piercing Nose  Marijuana use Last used on 3/9/20  Orthodontics MEDICATIONS:  
 
Current Outpatient Medications Medication Sig  
 aspirin (ASPIRIN) 325 mg tablet TAKE 1 TABLET BY MOUTH EVERY DAY  ibuprofen (MOTRIN) 800 mg tablet Take 1 Tab by mouth every eight (8) hours as needed for Pain.  famotidine (PEPCID) 40 mg tablet Take 1 Tab by mouth daily.  promethazine (PHENERGAN) 25 mg tablet Take 1 Tab by mouth every six (6) hours as needed for Nausea.  polyethylene glycol (Miralax) 17 gram packet Take 1 Packet by mouth daily. No current facility-administered medications for this visit. ALLERGIES:  
 
Allergies Allergen Reactions  Seasonale [Levonorgestrel-Ethinyl Estrad] Sneezing  Shellfish Derived Swelling Throat swells up PAST SURGICAL HISTORY:  
 
Past Surgical History:  
Procedure Laterality Date [facility]    
 
 
 SOCIAL HISTORY:  
 
Social History Socioeconomic History  Marital status: UNKNOWN Spouse name: Not on file  Number of children: Not on file  Years of education: Not on file  Highest education level: Not on file Occupational History  Not on file Social Needs  Financial resource strain: Not on file  Food insecurity Worry: Not on file Inability: Not on file  Transportation needs Medical: Not on file Non-medical: Not on file Tobacco Use  Smoking status: Never Smoker  Smokeless tobacco: Never Used Substance and Sexual Activity  Alcohol use: Not on file  Drug use: Yes Types: Marijuana Comment: last used on 3/9/20  Sexual activity: Not on file Lifestyle  Physical activity Days per week: Not on file Minutes per session: Not on file  Stress: Not on file Relationships  Social connections Talks on phone: Not on file Gets together: Not on file Attends Mu-ism service: Not on file Active member of club or organization: Not on file Attends meetings of clubs or organizations: Not on file Relationship status: Not on file  Intimate partner violence Fear of current or ex partner: Not on file Emotionally abused: Not on file Physically abused: Not on file Forced sexual activity: Not on file Other Topics Concern  Not on file Social History Narrative ** Merged History Encounter ** FAMILY HISTORY:  
 
Family History Problem Relation Age of Onset  No Known Problems Mother  No Known Problems Father  Hypertension Maternal Grandmother  Diabetes Maternal Grandmother Saintclair Poser, PA-C 
5/11/2020

## 2020-06-08 ENCOUNTER — OFFICE VISIT (OUTPATIENT)
Dept: ORTHOPEDIC SURGERY | Age: 20
End: 2020-06-08

## 2020-06-08 VITALS — HEIGHT: 76 IN | TEMPERATURE: 97.6 F | WEIGHT: 250 LBS | BODY MASS INDEX: 30.44 KG/M2

## 2020-06-08 DIAGNOSIS — S82.62XD CLOSED DISPLACED FRACTURE OF LATERAL MALLEOLUS OF LEFT FIBULA WITH ROUTINE HEALING, SUBSEQUENT ENCOUNTER: ICD-10-CM

## 2020-06-08 DIAGNOSIS — Z98.890 POST-OPERATIVE STATE: Primary | ICD-10-CM

## 2020-06-08 NOTE — PROGRESS NOTES
1. Have you been to the ER, urgent care clinic since your last visit? Hospitalized since your last visit? No    2. Have you seen or consulted any other health care providers outside of the [address] [place] [address] since your last visit? Include any pap smears or colon screening.  No

## 2020-06-08 NOTE — PATIENT INSTRUCTIONS
Weight bear as tolerated wearing the tall CAM boot Tubigrip provided for swelling Continue range of motion exercises PT ordered Follow up in 4 weeks or sooner as needed Ankle Fracture: Rehab Exercises Introduction Here are some examples of exercises for you to try. The exercises may be suggested for a condition or for rehabilitation. Start each exercise slowly. Ease off the exercises if you start to have pain. You will be told when to start these exercises and which ones will work best for you. How to do the exercises Calf stretch (knee straight) For this exercise, you will need a towel. 1. Sit with your affected leg straight and supported on the floor. Your other leg should be bent, with that foot flat on the floor. 2. Place a towel around your affected foot just under the toes. 3. Hold one end of the towel in each hand, with your hands above your knees. 4. Pull back gently with the towel so that your foot stretches toward you. 5. Hold the position for at least 15 to 30 seconds. 6. Repeat 2 to 4 times a session, up to 5 sessions a day. Calf stretch (knee bent) For this exercise, you will need a towel. You will also need a pillow or foam roll. 1. Sit with your affected leg straight and supported on the floor. Your other leg should be bent, with that foot flat on the floor. 2. Place a pillow or foam roll under your affected leg. 3. Place a towel around your affected foot just under the toes. 4. Hold one end of the towel in each hand, with your hands above your knees. 5. Pull back gently with the towel so that your foot stretches toward you. 6. Hold the position for at least 15 to 30 seconds. 7. Repeat 2 to 4 times a session, up to 5 sessions a day. Ankle plantar flexion 1. Sit with your affected leg straight and supported on the floor. Your other leg should be bent, with that foot flat on the floor. 2. Keeping your affected leg straight, gently flex your foot downward so your toes are pointed away from your body. Then slowly relax your foot to the starting position. 3. Repeat 8 to 12 times. Ankle dorsiflexion 1. Sit with your affected leg straight and supported on the floor. Your other leg should be bent, with that foot flat on the floor. 2. Keeping your affected leg straight, gently flex your foot back toward your body so your toes point upward. Then slowly relax your foot to the starting position. 3. Repeat 8 to 12 times. Resisted ankle plantar flexion For the next four exercises, you will need elastic exercise material, such as surgical tubing or Thera-Band. 1. Sit with your affected leg straight and supported on the floor. Your other leg should be bent, with that foot flat on the floor. 2. Place an elastic band around your affected foot just under the toes. 3. Hold each end of the band in each hand, with your hands above your knees. 4. Keeping your affected leg straight, gently flex your foot downward so your toes are pointed away from your body. Then slowly relax your foot to the starting position. 5. Repeat 8 to 12 times. Resisted ankle dorsiflexion 1. Tie the ends of an exercise band together to form a loop. Attach one end of the loop to a secure object, like a table leg, or shut a door on it to hold it in place. (Or you can have someone hold one end of the loop to provide resistance.) 2. While sitting on the floor or in a chair, loop the other end of the band over the top of your affected foot. 3. Keeping your knee and leg straight, slowly flex your foot toward you to pull back on the exercise band, and then slowly relax. 4. Repeat 8 to 12 times. Resisted ankle inversion 1. Sit on the floor with your good leg crossed over your other leg.  
2. Hold both ends of an exercise band and loop the band around the inside of your affected foot. Then press your good foot against the band. 3. Keeping your legs crossed, slowly push your affected foot against the band so that foot moves away from your good foot. Then slowly relax. 4. Repeat 8 to 12 times. Resisted ankle eversion 1. Sit on the floor with your legs straight. 2. Hold both ends of an exercise band and loop the band around the outside of your affected foot. Then press your good foot against the band. 3. Keeping your leg straight, slowly push your affected foot outward against the band and away from your good foot without letting your leg rotate. Then slowly relax. 4. Repeat 8 to 12 times. Ankle alphabet 1. Sit in a chair with your feet flat on the floor. (You can also do this exercise lying on your back with your affected leg propped up on a pillow). 2. Lift the heel of your affected foot off the floor, and slowly trace the letters of the alphabet. Heel raises 1. Stand with your feet a few inches apart, with your hands lightly resting on a counter or chair in front of you. 2. Slowly raise your heels off the floor while keeping your knees straight. 3. Hold for about 6 seconds, then slowly lower your heels to the floor. 4. Do 8 to 12 repetitions several times during the day. Follow-up care is a key part of your treatment and safety. Be sure to make and go to all appointments, and call your doctor if you are having problems. It's also a good idea to know your test results and keep a list of the medicines you take. Where can you learn more? Go to http://verito-ann marie.info/ Enter T800 in the search box to learn more about \"Ankle Fracture: Rehab Exercises. \" Current as of: March 2, 2020               Content Version: 12.5 © 4636-9555 [employer]. Care instructions adapted under license by [employer] (which disclaims liability or warranty for this information).  If you have questions about a medical condition or this instruction, always ask your healthcare professional. [place] [address] any warranty or liability for your use of this information.

## 2020-06-08 NOTE — PROGRESS NOTES
Patient: Jony Lombardo                MRN: [id number]       SSN: [id number]  YOB: 2000                 AGE: 23 y.o. SEX: male    PCP:None      Chief Complaint:   Chief Complaint   Patient presents with    Ankle Pain     Left ankle pain          DOS: 3/19/20      HPI:     The patient is a 23 y.o. male who presents today for follow up 80 days s/p: Open reduction internal fixation left ankle fracture, syndesmotic stabilization. Since we last saw the patient in the office on 6/1/20 by Dr. Jadiel Ott, the patient had a small opening along his lateral incision distally. Patient is here for follow-up wound check to surgical incision. At [address], patient had a small open area over the inferior portion of the surgical incision, he has about a 1 cm area that is pink and thickened there is no pus or expressible drainage no malodor this area has not fully healed. At that time, the plan for him was to be nonweightbearing, will cleanse the foot with a dermal spray once a day, apply Aquasol dressings every other day even for 2 days then take it off and replace it and follow  Up in 1 week to recheck him. He was reminded at that time to remain nonweightbearing until follow up. However, [facility] exercises. He reports that  He has not had any drainage from distal scab along lateral ankle. He has had increased swelling with wb.        Pain Assessment  6/8/2020   Location of Pain Ankle   Location Modifiers Left   Severity of Pain 4   Quality of Pain Throbbing;Aching   Quality of Pain Comment Swollen    Duration of Pain A few hours   Frequency of Pain Intermittent   Aggravating Factors (No Data)   Aggravating Factors Comment Socks makes the foot swell up    Limiting Behavior -   Relieving Factors Rest;Elevation   Relieving Factors Comment Epsom Salt   Result of Injury Yes   Work-Related Injury No   Type of Injury (No Data)   Type of Injury Comment Landed on it wrong          PHYSICAL EXAM:     Visit Vitals  Temp 97.6 °F (36.4 °C) (Oral)   Ht 6' 4\" (1.93 m)   Wt 250 lb (113.4 kg)   BMI 30.43 kg/m²         Pain Scale: 4/10    Pain Location:       GEN:  Alert, well developed, well nourished, well appearing 23 y.o. male seated comfortably in no acute distress. Speech normal in context and clarity. Psychiatric: Affect, mood and conduct are appropriate. Alert, oriented x 3 alert, oriented x 3, memory grossly intact, no dementia  Patient arrives to office via: with assistive device: crutches   Patient accompanied in the examination room by: self    M/S EXAMINATION OF: right foot/ankle  DRESSINGS: CDI   DRAINAGE: none  INCISION: Incision looks good, skin well approximated, no dehiscence, skin has a few dry escar with no open wounds or drainage  SKIN: mild edema, no erythema, no ecchymosis, no warmth,   TENDERNESS:  mild tenderness to palpation   NEUROVASCULAR:  grossly intact. Positive distal pulses and capillary refill. DVT ASSESSMENT:  The calf is not tender to palpation. No evidence of DVT seen on physical exam.  ROM: limited                  RADIOGRAPHS & DIAGNOSTIC STUDIES     None      IMPRESSION:     Encounter Diagnoses     ICD-10-CM ICD-9-CM   1. Post-operative state Z98.890 V45.89   2. Closed displaced fracture of lateral malleolus of left fibula with routine healing, subsequent encounter S82. 62XD V54.19       PLAN:       No orders of the defined types were placed in this encounter. Weight bear as tolerated wearing the tall CAM boot  Tubigrip provided for swelling  Continue range of motion exercises  PT ordered  Follow up in 4 weeks or sooner as needed        [place] presents today for post operative follow up and pain that is secondary to post operative state. Since surgery, the patient's pain has not changed. Patient describes the pain as aching, pulsating, throbbing.   Since surgery, the patient is not able to perform normal daily activities. Patient reports the following adverse side effects from treatment: none. Today - Pain Scale: 4/10    Prior records: N/A - post op  Personal or family history of psychiatric, addiction, or substance abuse: no    Aberrant behaviors: None.  reviewed: n/a. Carmen Ott has been consulted during this visit and he agrees with the assessment and plan.  reviewed    Patient expresses understanding of the plan. Patient education provided on post surgical care. REVIEW OF SYSTEMS:     Review of Systems: Chest pain: no  Shortness of breath: no  Fever: no  Night sweats: no  Weight loss: no  Constitutional signs: no  Review of all other systems is negative    Otherwise as noted in HPI      PAST MEDICAL HISTORY:     Past Medical History:   Diagnosis Date    Body piercing     Nose    Marijuana use     Last used on 3/9/20    Orthodontics        MEDICATIONS:     Current Outpatient Medications   Medication Sig    aspirin (ASPIRIN) 325 mg tablet TAKE 1 TABLET BY MOUTH EVERY DAY    ibuprofen (MOTRIN) 800 mg tablet Take 1 Tab by mouth every eight (8) hours as needed for Pain.  famotidine (PEPCID) 40 mg tablet Take 1 Tab by mouth daily.  promethazine (PHENERGAN) 25 mg tablet Take 1 Tab by mouth every six (6) hours as needed for Nausea.  polyethylene glycol (Miralax) 17 gram packet Take 1 Packet by mouth daily. No current facility-administered medications for this visit.         ALLERGIES:     Allergies   Allergen Reactions    Seasonale [Levonorgestrel-Ethinyl Estrad] Sneezing    Shellfish Derived Swelling     Throat swells up          PAST SURGICAL HISTORY:     Past Surgical History:   Procedure Laterality Date    HX ANKLE FRACTURE TX         SOCIAL HISTORY:     Social History     Socioeconomic History    Marital status: UNKNOWN     Spouse name: Not on file    Number of children: Not on file    Years of education: Not on file    Highest education level: Not on file   Occupational History    Not on file   Social Needs    Financial resource strain: Not on file    Food insecurity     Worry: Not on file     Inability: Not on file    Transportation needs     Medical: Not on file     Non-medical: Not on file   Tobacco Use    Smoking status: Never Smoker    Smokeless tobacco: Never Used   Substance and Sexual Activity    Alcohol use: Not on file    Drug use: Yes     Types: Marijuana     Comment: last used on 3/9/20    Sexual activity: Not on file   Lifestyle    Physical activity     Days per week: Not on file     Minutes per session: Not on file    Stress: Not on file   Relationships    Social connections     Talks on phone: Not on file     Gets together: Not on file     Attends Restoration service: Not on file     Active member of club or organization: Not on file     Attends meetings of clubs or organizations: Not on file     Relationship status: Not on file    Intimate partner violence     Fear of current or ex partner: Not on file     Emotionally abused: Not on file     Physically abused: Not on file     Forced sexual activity: Not on file   Other Topics Concern    Not on file   Social History Narrative    ** Merged History Encounter **            FAMILY HISTORY:     Family History   Problem Relation Age of Onset    No Known Problems Mother     No Known Problems Father     Hypertension Maternal Grandmother     Diabetes Maternal [id number] [facility] A Maria Luisa He PA-C  5/11/2020

## 2020-07-06 ENCOUNTER — OFFICE VISIT (OUTPATIENT)
Dept: ORTHOPEDIC SURGERY | Age: 20
End: 2020-07-06

## 2020-07-06 VITALS
BODY MASS INDEX: 31.81 KG/M2 | HEIGHT: 76 IN | WEIGHT: 261.2 LBS | HEART RATE: 83 BPM | OXYGEN SATURATION: 92 % | RESPIRATION RATE: 15 BRPM | TEMPERATURE: 97.3 F | SYSTOLIC BLOOD PRESSURE: 144 MMHG | DIASTOLIC BLOOD PRESSURE: 75 MMHG

## 2020-07-06 DIAGNOSIS — S82.62XD CLOSED DISPLACED FRACTURE OF LATERAL MALLEOLUS OF LEFT FIBULA WITH ROUTINE HEALING, SUBSEQUENT ENCOUNTER: Primary | ICD-10-CM

## 2020-07-06 DIAGNOSIS — M25.572 ACUTE LEFT ANKLE PAIN: ICD-10-CM

## 2020-07-06 NOTE — PROGRESS NOTES
AMBULATORY PROGRESS NOTE      Patient: Felicitas Masterson             MRN: [id number]     SSN: [id number] Body mass index is 31.79 kg/m². YOB: 2000     AGE: 23 y.o. EX: male    PCP: None       IMPRESSION //  DIAGNOSIS AND TREATMENT PLAN      DIAGNOSES  1. Closed displaced fracture of lateral malleolus of left fibula with routine healing, subsequent encounter    2. Acute left ankle pain        Orders Placed This Encounter    [56251] Ankle Min 3V     Order Specific Question:   Weight bearing? Answer:   No        PLAN:    1. Instructed pt to avoid high intensity activities, but walking elliptical  is fine. RTO- 1 month      HPI //  OBJECTIVE EXAMINATION      Jose Castaneda IS A 23 y.o. male who presents to my outpatient office for follow up 109 days s/p:     Open reduction internal fixation left ankle fracture, syndesmotic stabilization. Date of Surgery: March 19th, 2020    At the last [address] [place] [address] plan is as follows:      1. Weight bear as tolerated wearing the tall CAM boot  2. Tubigrip provided for swelling  3. Continue range of motion exercises  4. PT ordered        Since the last OV, Felicitas Masterson reports that he occasionally experiences sharp pains, but nothing major. He does not have any trouble with going up and down stairs. The pt occasionally takes Ibuprofen 800 mg whenever he has pain. Visit Vitals  /75 (BP 1 Location: Left arm, BP Patient Position: Sitting)   Pulse 83   Temp 97.3 °F (36.3 °C) (Oral)   Resp 15   Ht 6' 4\" (1.93 m)   Wt 261 lb 3.2 oz (118.5 kg)   SpO2 92%   BMI 31.79 kg/m²       GEN:  Alert, well developed, well nourished, well appearing 23 y.o. male seated comfortably in no acute distress. Speech normal in context and clarity.     Psychiatric: Affect, mood and conduct are appropriate.  Alert, oriented x 3 alert, oriented x 3, memory grossly intact, no dementia  Patient arrives to office via: with assistive device: crutches   Patient accompanied in the examination room by: self     M/S EXAMINATION OF: right foot/ankle  DRESSINGS: CDI   DRAINAGE: none  INCISION: WELL HEALED  SKIN: mild swelling medial lateral aspect of the ankle, no erythema, no ecchymosis, no warmth,   TENDERNESS:  mild tenderness to palpation; no instability  NEUROVASCULAR:  grossly intact. Positive distal pulses and capillary refill. DVT ASSESSMENT:  The calf is not tender to palpation. No evidence of DVT seen on physical exam.  ROM: limited      CHART REVIEW     There is no problem list on file for this patient. Carlos Sahni has been experiencing pain and discomfort confirmed as outlined in the pain assessment outlined below. Pain Assessment  7/6/2020   Location of Pain Ankle   Location Modifiers Left   Severity of Pain 0   Quality of Pain -   Quality of Pain Comment -   Duration of Pain -   Frequency of Pain -   Aggravating Factors -   Aggravating Factors Comment -   Limiting Behavior -   Relieving Factors -   Relieving Factors Comment -   Result of Injury -   Work-Related Injury -   Type of Injury -   Type of Injury Comment -        Jose Welsh  has a past medical history of Body piercing, Marijuana use, and Orthodontics. Patients is employed at:         Past Medical History:   Diagnosis Date    Body piercing     Nose    Marijuana use     Last used on 3/9/20    Orthodontics      Past Surgical History:   Procedure Laterality Date    HX ANKLE FRACTURE TX       Current Outpatient Medications   Medication Sig    aspirin (ASPIRIN) 325 mg tablet TAKE 1 TABLET BY MOUTH EVERY DAY    ibuprofen (MOTRIN) 800 mg tablet Take 1 Tab by mouth every eight (8) hours as needed for Pain.  famotidine (PEPCID) 40 mg tablet Take 1 Tab by mouth daily.  promethazine (PHENERGAN) 25 mg tablet Take 1 Tab by mouth every six (6) hours as needed for Nausea.  polyethylene glycol (Miralax) 17 gram packet Take 1 Packet by mouth daily.      No current facility-administered medications for this visit. Allergies   Allergen Reactions    Seasonale [Levonorgestrel-Ethinyl Estrad] Sneezing    Shellfish Derived Swelling     Throat swells up      Social History     Occupational History    Not on file   Tobacco Use    Smoking status: Never Smoker    Smokeless tobacco: Never Used   Substance and Sexual Activity    Alcohol use: Not on file    Drug use: Yes     Types: Marijuana     Comment: last used on 3/9/20    Sexual activity: Not on file     Family History   Problem Relation Age of Onset    No Known Problems Mother     No Known Problems Father     Hypertension Maternal Grandmother     Diabetes Maternal Grandmother        THE  FOR Jose Teran BY Judson Roblero 7/6/2020 . DIAGNOSTIC IMAGING  LAB DATA      No results found for: HBA1C, HGBE8, HVO7IJVA, ATW3BXWD //   Lab Results   Component Value Date/Time    Glucose 82 03/17/2020 11:30 AM        No results found for: VWG9CISP, IUN0JAMT      No results found for: VITD3, XQVID2, XQVID3, XQVID, VD3RIA, [id number]      REVIEW OF SYSTEMS : 7/6/2020  ALL BELOW ARE Negative except : SEE HPI     Constitutional: Negative for fever, chills and weight loss. Neg Weight Loss  Cardiovascular: Negative for chest pain, claudication and leg swelling. SOB, RUTH   Gastrointestinal/Urological: Negative for pain, N/V/D/C, Blood in stool or urine,dysuria, Hematuria, Incontinence  Musculoskeletal: see HPI. Neurological: Negative for dizziness and weakness, headaches,Visual Changes or             Confusion, or Seizures,   Psychiatric/Behavioral: Negative for depression, memory loss and substance abuse. Extremities:  Negative for hair changes, rash or skin lesion changes. Hematologic: Negative for Bleeding problems, bruising, pallor or swollen lymph nodes.   Peripheral Vascular: No calf pain, vascular vein tenderness to calf pain// No calf throbbing, posterior knee throbbing pain     DIAGNOSTIC IMAGING      ANKLE X RAYS 3 VIEWS Left   X RAYS AT [address] [place] [address]  7/6/2020    NON WEIGHT BEARING    SOFT TISSUES:                Soft tissue swelling, No soft tissue calcifications, No Calcified blood vessels     Soft tissue swelling location:    mild  to fibula region        mild medial aspect    mild dorsal midfoot/forefoot     OSSEOUS:      Fractures of the lateral malleolus is not  present. HEALING OF FRACTURES: complete   Hardware to the ankle are in   good position  No subluxations, dislocations are noted to ankle or subtalar joint regions  Mineralization: suggests  Some mild disuse osteopenia  Bone Spurs No significant bone spurs     ALIGNMENT:    Ankle mortise alignment is congruent. Tibial plafond and talar dome intact      No Osteochondral defects seen    No Ankle joint effusion seen     Ankle Mortise alignment is: Congruent  Talar Dome:  {Blank Multiple Select Template:72543::\"No Osteochondral defects seen ,Tibial plafond and talar dome intact  Joint Condition: No significant OA      I have personally reviewed the images of the above study. The interpretation of this study is my professional opinion. Rubio Billings MD  7/6/2020  1:20 PM        Rosana Hernandez  7/6/2020  11:43 AM      Disclaimer: Sections of this note are dictated using utilizing voice recognition software, which may have resulted in some phonetic based errors in grammar and contents. Even though attempts were made to correct all the mistakes, some may have been missed, and remained in the body of the document. If questions arise, please contact our department.

## 2020-08-03 ENCOUNTER — OFFICE VISIT (OUTPATIENT)
Dept: ORTHOPEDIC SURGERY | Age: 20
End: 2020-08-03

## 2020-08-03 VITALS
HEIGHT: 76 IN | HEART RATE: 76 BPM | SYSTOLIC BLOOD PRESSURE: 147 MMHG | RESPIRATION RATE: 16 BRPM | WEIGHT: 261 LBS | TEMPERATURE: 99.3 F | OXYGEN SATURATION: 91 % | BODY MASS INDEX: 31.78 KG/M2 | DIASTOLIC BLOOD PRESSURE: 88 MMHG

## 2020-08-03 DIAGNOSIS — Z98.890 POST-OPERATIVE STATE: ICD-10-CM

## 2020-08-03 DIAGNOSIS — S82.62XD CLOSED DISPLACED FRACTURE OF LATERAL MALLEOLUS OF LEFT FIBULA WITH ROUTINE HEALING, SUBSEQUENT ENCOUNTER: Primary | ICD-10-CM

## 2020-08-03 RX ORDER — IBUPROFEN 800 MG/1
800 TABLET ORAL
Qty: 90 TAB | Refills: 1 | Status: SHIPPED | OUTPATIENT
Start: 2020-08-03

## 2020-08-03 NOTE — PROGRESS NOTES
AMBULATORY PROGRESS NOTE      Patient: Lex Gonsales             MRN: [id number]     SSN: [id number] Body mass index is 31.77 kg/m². YOB: 2000     AGE: 23 y.o. EX: male    PCP: None       IMPRESSION //  DIAGNOSIS AND TREATMENT PLAN      DIAGNOSES  1. Closed displaced fracture of lateral malleolus of left fibula with routine healing, subsequent encounter    2. Post-operative state        Orders Placed This Encounter    ibuprofen (MOTRIN) 800 mg tablet     Sig: Take 1 Tab by mouth every eight (8) hours as needed for Pain. Dispense:  90 Tab     Refill:  1        He is doing better still has some mild residual swelling but he is having no pain to this ankle left ankle fracture left ankle fracture several months ago. He is about 4-1/2 to 5 months out now. Admits having no pain to some slight swelling. PLAN:    1. Double-calf raises  2. Continue ankle strengthening exercises with Theraband  3. . RTO- 2 months //PLEASE OBTAIN X-RAYS OF: left ankle 3 VIEWS      HPI //  OBJECTIVE EXAMINATION      Jose Hoffmann IS A 23 y.o. male who presents to my outpatient office for follow up 4.5 months s/p:     Open reduction internal fixation left ankle fracture, syndesmotic stabilization. Date of Surgery: March 19th, 2020    At the last OV instructed the patient to continue activity modification as directed    Since the last OV, Lex Gonsales continues to endorse sharp pain occasionally. He states that the pain is localized to the incision site. He reports some pain with prolonged walking and standing. He has been doing the Theraband exercises at home, which has helped with the pain.       Visit Vitals  /88 (BP 1 Location: Left arm, BP Patient Position: Sitting)   Pulse 76   Temp 99.3 °F (37.4 °C) (Oral)   Resp 16   Ht 6' 4\" (1.93 m)   Wt 261 lb (118.4 kg)   SpO2 91%   BMI 31.77 kg/m²       GEN:  Alert, well developed, well nourished, well appearing 23 y.o. male seated comfortably in no acute distress. Speech normal in context and clarity.     Psychiatric: Affect, mood and conduct are appropriate. Alert, oriented x 3 alert, oriented x 3, memory grossly intact, no dementia  Patient arrives to office via: with assistive device: crutches   Patient accompanied in the examination room by: self     M/S EXAMINATION OF: right foot/ankle  DRESSINGS: None  DRAINAGE: None  INCISION: WELL HEALED  SKIN: mild swelling medial and lateral aspect of the ankle, no erythema, no ecchymosis, no warmth,   TENDERNESS:  mild tenderness to palpation; no instability  NEUROVASCULAR:  grossly intact. Positive distal pulses and capillary refill. DVT ASSESSMENT:  The calf is not tender to palpation. No evidence of DVT seen on physical exam.  ROM: improved//no ankle instability        CHART REVIEW     There is no problem list on file for this patient. Ck Ackerman has been experiencing pain and discomfort confirmed as outlined in the pain assessment outlined below. Pain Assessment  8/3/2020   Location of Pain Ankle   Location Modifiers Left   Severity of Pain 0   Quality of Pain Sharp   Quality of Pain Comment cramping up   Duration of Pain A few hours   Frequency of Pain Intermittent   Aggravating Factors -   Aggravating Factors Comment -   Limiting Behavior -   Relieving Factors -   Relieving Factors Comment -   Result of Injury -   Work-Related Injury -   Type of Injury -   Type of Injury Comment -        Ck Ackerman  has a past medical history of Body piercing, Marijuana use, and Orthodontics. Patients is employed at:         Past Medical History:   Diagnosis Date    Body piercing     Nose    Marijuana use     Last used on 3/9/20    Orthodontics      Past Surgical History:   Procedure Laterality Date    HX ANKLE FRACTURE TX       Current Outpatient Medications   Medication Sig    ibuprofen (MOTRIN) 800 mg tablet Take 1 Tab by mouth every eight (8) hours as needed for Pain.     aspirin (ASPIRIN) 325 mg tablet TAKE 1 TABLET BY MOUTH EVERY DAY    famotidine (PEPCID) 40 mg tablet Take 1 Tab by mouth daily.  promethazine (PHENERGAN) 25 mg tablet Take 1 Tab by mouth every six (6) hours as needed for Nausea.  polyethylene glycol (Miralax) 17 gram packet Take 1 Packet by mouth daily. No current facility-administered medications for this visit. Allergies   Allergen Reactions    Seasonale [Levonorgestrel-Ethinyl Estrad] Sneezing    Shellfish Derived Swelling     Throat swells up      Social History     Occupational History    Not on file   Tobacco Use    Smoking status: Never Smoker    Smokeless tobacco: Never Used   Substance and Sexual Activity    Alcohol use: Not on file    Drug use: Yes     Types: Marijuana     Comment: last used on 3/9/20    Sexual activity: Not on file     Family History   Problem Relation Age of Onset    No Known Problems Mother     No Known Problems Father     Hypertension Maternal Grandmother     Diabetes Maternal Grandmother        THE  FOR Jose Francisco Selby MD 8/3/2020 . DIAGNOSTIC IMAGING  LAB DATA      No results found for: HBA1C, HGBE8, MWV2VHJP, LMZ7LHEK //   Lab Results   Component Value Date/Time    Glucose 82 03/17/2020 11:30 AM        No results found for: PFR6YICJ, TXO3EIVL      No results found for: VITD3, XQVID2, XQVID3, XQVID, VD3RIA, [id number]      REVIEW OF SYSTEMS : 8/3/2020  ALL BELOW ARE Negative except : SEE HPI     Constitutional: Negative for fever, chills and weight loss. Neg Weight Loss  Cardiovascular: Negative for chest pain, claudication and leg swelling. SOB, RUTH   Gastrointestinal/Urological: Negative for pain, N/V/D/C, Blood in stool or urine,dysuria, Hematuria, Incontinence  Musculoskeletal: see HPI.   Neurological: Negative for dizziness and weakness, headaches,Visual Changes or             Confusion, or Seizures,   Psychiatric/Behavioral: Negative for depression, memory loss and substance abuse.   Extremities:  Negative for hair changes, rash or skin lesion changes. Hematologic: Negative for Bleeding problems, bruising, pallor or swollen lymph nodes. Peripheral Vascular: No calf pain, vascular vein tenderness to calf pain// No calf throbbing, posterior knee throbbing pain     DIAGNOSTIC IMAGING      No notes on file    Please see above section of this report. I have personally reviewed the results of the above study. The interpretation of this study is my professional opinion. Written by Amy Ibarra, as dictated by Dr. Genoveva Phillips. I, Dr. Genoveva Phillips, confirm that all documentation is accurate.